# Patient Record
Sex: FEMALE | Race: WHITE | NOT HISPANIC OR LATINO | Employment: UNEMPLOYED | URBAN - METROPOLITAN AREA
[De-identification: names, ages, dates, MRNs, and addresses within clinical notes are randomized per-mention and may not be internally consistent; named-entity substitution may affect disease eponyms.]

---

## 2017-06-19 ENCOUNTER — HOSPITAL ENCOUNTER (OUTPATIENT)
Dept: OTHER | Facility: HOSPITAL | Age: 47
Discharge: HOME | End: 2017-06-19

## 2018-01-31 ENCOUNTER — OFFICE VISIT CONVERTED (OUTPATIENT)
Dept: NEUROSURGERY | Facility: CLINIC | Age: 48
End: 2018-01-31
Attending: PHYSICIAN ASSISTANT

## 2018-01-31 ENCOUNTER — CONVERSION ENCOUNTER (OUTPATIENT)
Dept: NEUROLOGY | Facility: CLINIC | Age: 48
End: 2018-01-31

## 2018-02-15 ENCOUNTER — OFFICE VISIT CONVERTED (OUTPATIENT)
Dept: NEUROSURGERY | Facility: CLINIC | Age: 48
End: 2018-02-15
Attending: PHYSICIAN ASSISTANT

## 2018-04-09 ENCOUNTER — OFFICE VISIT CONVERTED (OUTPATIENT)
Dept: NEUROSURGERY | Facility: CLINIC | Age: 48
End: 2018-04-09
Attending: PHYSICIAN ASSISTANT

## 2018-06-21 ENCOUNTER — HOSPITAL ENCOUNTER (OUTPATIENT)
Dept: OTHER | Facility: HOSPITAL | Age: 48
Discharge: HOME | End: 2018-06-21

## 2018-09-27 ENCOUNTER — OFFICE VISIT CONVERTED (OUTPATIENT)
Dept: SURGERY | Facility: CLINIC | Age: 48
End: 2018-09-27
Attending: NURSE PRACTITIONER

## 2019-05-21 ENCOUNTER — HOSPITAL ENCOUNTER (OUTPATIENT)
Dept: ULTRASOUND IMAGING | Facility: HOSPITAL | Age: 49
Discharge: HOME | End: 2019-05-21
Attending: OTOLARYNGOLOGY

## 2019-06-21 ENCOUNTER — HOSPITAL ENCOUNTER (OUTPATIENT)
Dept: OTHER | Facility: HOSPITAL | Age: 49
Discharge: HOME | End: 2019-06-21

## 2020-06-18 ENCOUNTER — OFFICE VISIT CONVERTED (OUTPATIENT)
Dept: NEUROSURGERY | Facility: CLINIC | Age: 50
End: 2020-06-18
Attending: PHYSICIAN ASSISTANT

## 2020-07-02 ENCOUNTER — OFFICE VISIT CONVERTED (OUTPATIENT)
Dept: NEUROSURGERY | Facility: CLINIC | Age: 50
End: 2020-07-02
Attending: PHYSICIAN ASSISTANT

## 2020-07-29 ENCOUNTER — CONVERSION ENCOUNTER (OUTPATIENT)
Dept: OTHER | Facility: HOSPITAL | Age: 50
End: 2020-07-29

## 2020-08-04 ENCOUNTER — OFFICE VISIT CONVERTED (OUTPATIENT)
Dept: NEUROSURGERY | Facility: CLINIC | Age: 50
End: 2020-08-04
Attending: NEUROLOGICAL SURGERY

## 2021-05-13 NOTE — PROGRESS NOTES
Progress Note      Patient Name: Ashli Gutierrez   Patient ID: [id number]   Sex: Female   YOB: 1970    Primary Care Provider: Rebecca Sky MD   Referring Provider: Radha CHEATHAM    Visit Date: August 4, 2020    Provider: Fred Muro MD   Location: [facility]   Location Address: [address] [place] [address]  [id number]   Location Phone: [phone]          Chief Complaint  · Surgical History and Physical     Here with complaints of low back and right greater than left leg pain.       History Of Present Illness     She had a discectomy a right L5-S1 in 2018. Pt now has a left L5-S1 disc protrusion. The left leg has started to bother her more, but the right leg is still worse. She is getting around a little better with an increase in her pain medication and increase tizanidine. She has had a couple of significant flareups, particularly the right leg, over the last 6 weeks.       Past Medical History  Allergic rhinitis, chronic; Arthritis; Bladder Disorder; Breast lump; CAD (coronary artery disease); Chest pain; Diabetes; GERD; Heart Attack; Heart Disease; High blood pressure; High cholesterol; Limb Pain; Limb Swelling; Lumbago/low back pain; Neurologic disorder; Sciatica         Past Surgical History  Back; Caesarean section; Cardiac; cardiac stents; Gallbladder; Minimally invasive Discectomy         Medication List  aspirin 81 mg oral tablet,delayed release (DR/EC); atorvastatin 80 mg oral tablet; cetirizine 10 mg oral tablet; gabapentin 300 mg oral capsule; isosorbide mononitrate oral; losartan 100 mg oral tablet; meloxicam 7.5 mg oral tablet; metformin 1,000 mg oral tablet; metoprolol succinate 25 mg oral tablet extended release 24 hr; montelukast 10 mg oral tablet; Nitro .4 mg; Norco 7.5-325 mg oral tablet; pantoprazole 40 mg oral tablet,delayed release (DR/EC); Plavix 75 mg oral tablet; tizanidine 4 mg oral tablet; Zofran (as hydrochloride) 4 mg oral tablet  "        Allergy List  I.V. Dye; naproxen; NSAIDS; PENICILLINS         Family Medical History  Diabetes, unspecified type; Family history of colon cancer; Family history of breast cancer         Social History  Alcohol (Current some day); Caffeine (Never); Second hand smoke exposure (Never); Tobacco (Former)         Review of Systems  · Constitutional  o Denies  o : chills, excessive sweating, fatigue, fever, sycope/passing out, weight gain, weight loss  · Eyes  o Denies  o : changes in vision, blurry vision, double vision  · HENT  o Admits  o : ringing in the ears, sinus pain, seasonal allergies  o Denies  o : loss of hearing, ear aches, sore throat, nasal congestion, nose bleeds  · Cardiovascular  o Denies  o : blood clots, swollen legs, anemia, easy burising or bleeding, transfusions  · Respiratory  o Denies  o : shortness of breath, dry cough, productive cough, pneumonia, COPD  · Gastrointestinal  o Admits  o : reflux  o Denies  o : difficulty swallowing  · Genitourinary  o Denies  o : incontinence  · Neurologic  o Admits  o : difficulty with sleep, numbness/tingling/paresthesia   o Denies  o : headache, seizure, stroke, tremor, loss of balance, falls, dizziness/vertigo, difficulty with coordination, difficulty with dexterity, weakness  · Musculoskeletal  o Admits  o : muscle aches, joint pain, sciatica, pain radiating in leg, low back pain  o Denies  o : neck stiffness/pain, swollen lymph nodes, weakness, spasms, pain radiating in arm  · Endocrine  o Admits  o : diabetes, thyroid disorder  · Psychiatric  o Denies  o : anxiety, depression      Vitals  Date Time BP Position Site L\R Cuff Size HR RR TEMP (F) WT  HT  BMI kg/m2 BSA m2 O2 Sat        08/04/2020 10:01 AM        97.3 209lbs 1oz 5'  6\" 33.74 2.1           Physical Examination  · Constitutional  o Appearance  o : well-nourished, well developed, alert, in no acute distress  · Respiratory  o Respiratory Effort  o : breathing " unlabored  · Cardiovascular  o Peripheral Vascular System  o :   § Extremities  § : no cyanosis  · Psychiatric  o Mood and Affect  o : mood normal, affect appropriate              Assessment  · Lumbago/low back pain     724.3/M54.40  · Lumbosacral disc herniation, L5-S1     722.10/M51.27  Left with right leg symptoms. Previous right L5-S1 discectomy.   · Sciatica     724.3/M54.30  · Preoperative examination     V72.84/Z01.818      Plan  · Medications  o Medications have been Reconciled  o Transition of Care or Provider Policy  · Instructions  o She could potentially benefit from a lumbar epidural steroid block. She will monitor the left leg symptoms and if worsening, could consider a discectomy.   o ****Surgical Orders****  o Outpatient  o RISK AND BENEFITS:  o Possible risks/complications, benefits and alternatives to surgical or invasive procedure have been explained to the patient and/or legal guardian.  o Patient has been evaluated and can tolerate anesthesia and/or sedation. Risks, benefits, and alternatives to anesthesia and sedation have been explained to the patient and/or legal guardian.  o PREP: Per protocol;  o IV: Per Anesthesia;  o *******************************************  o PRE- OP MEDICATION ORDER:  o *******************************************  o Clindamycin 900 mg IV on call to OR.  o ***************  o Date of Surgical Procedure:   o Please sign permit for:  o Left Approach Minimally Invasive Discectomy,  o lumbar five to sacral one  o The above History and Physical must have been completed within 30 days of admission.            Electronically Signed by: Fred Muro MD -Author on August 20, 2020 12:57:09 PM

## 2021-05-13 NOTE — PROGRESS NOTES
Progress Note      Patient Name: Ashli Gutierrez   Patient ID: [id number]   Sex: Female   YOB: 1970    Primary Care Provider: Rebecca Sky MD   Referring Provider: Radha CHEATHAM    Visit Date: July 2, 2020    Provider: Yasmeen Sharif PA-C   Location: [facility]   Location Address: [address] [place] [address]  [id number]   Location Phone: [phone]          Chief Complaint     Patient is here to discuss MRI results .       History Of Present Illness     MRI of Lspine showed L5/S1 disc extrusion on left. There is also an L3/4 left disc extrusion. She has had a previous right discectomy at L5/S1.  Pt notes that she continues to have right leg pain. She notes that she has numbness in 2 little toes. She has pain on bottom of foot. Pt notes that she has had PT previously and LESIs and they didn't really help much.       Past Medical History  Allergic rhinitis, chronic; Arthritis; Bladder Disorder; Breast lump; CAD (coronary artery disease); Chest pain; Diabetes; GERD; Heart Attack; Heart Disease; High blood pressure; High cholesterol; Limb Pain; Limb Swelling; Neurologic disorder         Past Surgical History  Back; Caesarean section; Cardiac; cardiac stents; Gallbladder; Minimally invasive Discectomy         Medication List  aspirin 81 mg oral tablet,delayed release (DR/EC); atorvastatin 80 mg oral tablet; cetirizine 10 mg oral tablet; gabapentin 300 mg oral capsule; isosorbide mononitrate oral; losartan 100 mg oral tablet; meloxicam 7.5 mg oral tablet; metformin 1,000 mg oral tablet; metoprolol succinate 25 mg oral tablet extended release 24 hr; montelukast 10 mg oral tablet; Nitro .4 mg; Norco 7.5-325 mg oral tablet; pantoprazole 40 mg oral tablet,delayed release (DR/EC); Plavix 75 mg oral tablet; Zofran (as hydrochloride) 4 mg oral tablet         Allergy List  I.V. Dye; naproxen; NSAIDS; PENICILLINS         Family Medical History  Diabetes, unspecified type;  "Family history of colon cancer; Family history of breast cancer         Social History  Alcohol (Current some day); Caffeine (Never); Second hand smoke exposure (Never); Tobacco (Former)         Review of Systems  · Constitutional  o Denies  o : fever, headache, chills  · Eyes  o Denies  o : eye pain, double vision, blurred vision  · HENT  o Denies  o : sinus problems, sore throat, ear infection  · Cardiovascular  o Denies  o : chest pain, high blood pressure, varicosities  · Respiratory  o Denies  o : shortness of breath, wheezing, frequent cough  · Gastrointestinal  o Denies  o : nausea, vomiting, heartburn, indigestion, abdominal pain  · Genitourinary  o Denies  o : urgency, frequency, urinary retention, painful urination  · Integument  o Denies  o : rash, itching, boils  · Neurologic  o Denies  o : tingling or numbness, tremors, dizzy spells  · Musculoskeletal  o Admits  o : back pain  o Denies  o : joint pain, neck pain  · Endocrine  o Denies  o : cold intolerance, heat intolerance, tired, excessive thirst, sluggish  · Psychiatric  o Admits  o : feels satisfied with life  o Denies  o : severe depression, concerns with hurting themselves  · Heme-Lymph  o Denies  o : swollen glands, blood clotting problems  · Allergic-Immunologic  o Denies  o : sinus allergy symptoms, hay fever      Vitals  Date Time BP Position Site L\R Cuff Size HR RR TEMP (F) WT  HT  BMI kg/m2 BSA m2 O2 Sat        07/02/2020 10:09 AM        97.5 210lbs 0oz 5'  6\" 33.89 2.11           Physical Examination  · Constitutional  o Appearance  o : well-nourished, well developed, alert, in no acute distress  · Respiratory  o Respiratory Effort  o : breathing unlabored  · Cardiovascular  o Peripheral Vascular System  o :   § Extremities  § : no cyanosis, clubbing or edema  · Neurologic  o Mental Status Examination  o :   § Orientation  § : grossly oriented to person, place and time  o Motor Examination  o :   § RLE Strength  § : strength " normal  § RLE Motor Function  § : tone normal, no atrophy, no abnormal movements noted  § LLE Strength  § : strength normal  § LLE Motor Function  § : tone normal, no atrophy, no abnormal movements noted  o Reflexes  o :   § RLE  § : 2/4 knee and ankle reflex  § LLE  § : 2/4 knee and ankle reflex  o Sensation  o :   § Light Touch  § : sensation intact to light touch in extremities  o Gait and Station  o :   § Gait Screening  § : gait within normal limits  · Psychiatric  o Mood and Affect  o : mood normal, affect appropriate     Straight leg raise positive on right. Tenderness of Lspine paraspinals.               Assessment  · Lumbago/low back pain     724.3/M54.40  · Sciatica     724.3/M54.30      Plan  · Medications  o Medications have been Reconciled  o Transition of Care or Provider Policy  · Instructions  o Pain management could consider increasing Norco to 7.5mg. Will followup in 1 month and consider a surgery, as there could be a small recurrent disc protrusion on right. Return sooner as needed. Get next LESI.   o Electronically Identified Patient Education Materials Provided Electronically  · Disposition  o Call or Return if symptoms worsen or persist.            Electronically Signed by: Yasmeen Sharif PA-C -Author on July 8, 2020 11:11:12 AM

## 2021-05-13 NOTE — PROGRESS NOTES
Progress Note      Patient Name: Ashli Gutierrez   Patient ID: [id number]   Sex: Female   YOB: 1970    Primary Care Provider: Rebecca Sky MD   Referring Provider: Radha CHEATHAM    Visit Date: June 18, 2020    Provider: Yasmeen Sharif PA-C   Location: [facility]   Location Address: [address] [place] [address]  [id number]   Location Phone: [phone]          Chief Complaint     Patient is here with complaints of worsening low back and left leg pain. No new images.       History Of Present Illness     She had previous discectomy with us. She notes that she has been having worsening low back pain and left leg pain. She notes that she continues to see pain management. She has been getting RFAs with pain management. She notes that she had to go back on her pain medication (Hydrocodone). She also continues gabapentin.       Past Medical History  Allergic rhinitis, chronic; Arthritis; Bladder Disorder; Breast lump; CAD (coronary artery disease); Chest pain; Diabetes; GERD; Heart Attack; Heart Disease; High blood pressure; High cholesterol; Limb Pain; Limb Swelling; Neurologic disorder         Past Surgical History  Back; Caesarean section; Cardiac; cardiac stents; Gallbladder; Minimally invasive Discectomy         Medication List  Acidophilus Probiotic 100 million cell-10 mg oral capsule; aspirin 81 mg oral tablet,delayed release (DR/EC); atorvastatin 80 mg oral tablet; cetirizine 10 mg oral tablet; gabapentin 300 mg oral capsule; isosorbide mononitrate oral; losartan 100 mg oral tablet; meloxicam 7.5 mg oral tablet; metformin 1,000 mg oral tablet; metoprolol succinate 25 mg oral tablet extended release 24 hr; montelukast 10 mg oral tablet; Nitro .4 mg; Norco 7.5-325 mg oral tablet; pantoprazole 40 mg oral tablet,delayed release (DR/EC); Plavix 75 mg oral tablet; Zantac Maximum Strength 150 mg oral tablet; Zofran (as hydrochloride) 4 mg oral tablet         Allergy  "List  I.V. Dye; naproxen; NSAIDS; PENICILLINS         Family Medical History  Diabetes, unspecified type; Family history of colon cancer; Family history of breast cancer         Social History  Alcohol (Current some day); Caffeine (Never); Second hand smoke exposure (Never); Tobacco (Former)         Review of Systems  · Constitutional  o Denies  o : fever, headache, chills  · Eyes  o Denies  o : eye pain, double vision, blurred vision  · HENT  o Denies  o : sinus problems, sore throat, ear infection  · Cardiovascular  o Denies  o : chest pain, high blood pressure, varicosities  · Respiratory  o Denies  o : shortness of breath, wheezing, frequent cough  · Gastrointestinal  o Denies  o : nausea, vomiting, heartburn, indigestion, abdominal pain  · Genitourinary  o Denies  o : urgency, frequency, urinary retention, painful urination  · Integument  o Denies  o : rash, itching, boils  · Neurologic  o Denies  o : tingling or numbness, tremors, dizzy spells  · Musculoskeletal  o Admits  o : back pain  o Denies  o : joint pain, neck pain  · Endocrine  o Denies  o : cold intolerance, heat intolerance, tired, excessive thirst, sluggish  · Psychiatric  o Admits  o : feels satisfied with life  o Denies  o : severe depression, concerns with hurting themselves  · Heme-Lymph  o Denies  o : swollen glands, blood clotting problems  · Allergic-Immunologic  o Denies  o : sinus allergy symptoms, hay fever      Vitals  Date Time BP Position Site L\R Cuff Size HR RR TEMP (F) WT  HT  BMI kg/m2 BSA m2 O2 Sat        06/18/2020 10:20 AM        97.5 208lbs 0oz 5'  6\" 33.57 2.1           Physical Examination  · Constitutional  o Appearance  o : well-nourished, well developed, alert, in no acute distress  · Respiratory  o Respiratory Effort  o : breathing unlabored  · Cardiovascular  o Peripheral Vascular System  o :   § Extremities  § : no cyanosis, clubbing or edema  · Neurologic  o Mental Status Examination  o :   § Orientation  § : grossly " oriented to person, place and time  o Motor Examination  o :   § RLE Strength  § : strength normal  § RLE Motor Function  § : tone normal, no atrophy, no abnormal movements noted  § LLE Strength  § : strength normal  § LLE Motor Function  § : tone normal, no atrophy, no abnormal movements noted  o Reflexes  o :   § RLE  § : 2/4 knee and ankle reflex  § LLE  § : 2/4 knee and ankle reflex  o Sensation  o :   § Light Touch  § : sensation intact to light touch in extremities  o Gait and Station  o :   § Gait Screening  § : gait within normal limits  · Psychiatric  o Mood and Affect  o : mood normal, affect appropriate     Straight leg raise positive on right. Tenderness of Lspine paraspinals.           Assessment  · Lumbago/low back pain     724.3/M54.40  · Sciatica     724.3/M54.30      Plan  · Orders  o MRI lumbar spine w/w/o contrst (21969) - 724.3/M54.40, 724.3/M54.30 - 06/18/2020  · Medications  o tizanidine 4 mg oral capsule   SIG: take 1 capsule (4 mg) by oral route 3 times per day for 30 days   DISP: (90) capsules with 1 refills  Prescribed on 06/18/2020     o Medications have been Reconciled  o Transition of Care or Provider Policy  · Instructions  o Encouraged to follow-up with Primary Care Provider for preventative care.  o Will refill tizanidine for patient. Will order MRI of Lspine w/wo contrast and return afterwards. Take Benadryl 50mg night prior to having MRI and 25mg 30minutes prior to MRI.   o Electronically Identified Patient Education Materials Provided Electronically  · Disposition  o Call or Return if symptoms worsen or persist.            Electronically Signed by: Yasmeen Sharif PA-C -Author on June 18, 2020 11:12:09 AM

## 2021-05-15 VITALS — BODY MASS INDEX: 33.43 KG/M2 | HEIGHT: 66 IN | TEMPERATURE: 97.5 F | WEIGHT: 208 LBS

## 2021-05-15 VITALS — WEIGHT: 209.06 LBS | BODY MASS INDEX: 33.6 KG/M2 | HEIGHT: 66 IN | TEMPERATURE: 97.3 F

## 2021-05-15 VITALS — HEIGHT: 66 IN | WEIGHT: 210 LBS | BODY MASS INDEX: 33.75 KG/M2 | TEMPERATURE: 97.5 F

## 2021-05-15 VITALS — TEMPERATURE: 97.9 F

## 2021-05-16 VITALS — HEIGHT: 66 IN | BODY MASS INDEX: 33.75 KG/M2 | HEART RATE: 86 BPM | WEIGHT: 210 LBS

## 2021-05-16 VITALS — WEIGHT: 212 LBS | BODY MASS INDEX: 34.07 KG/M2 | HEART RATE: 78 BPM | HEIGHT: 66 IN

## 2021-05-16 VITALS — BODY MASS INDEX: 33.75 KG/M2 | WEIGHT: 210 LBS | RESPIRATION RATE: 14 BRPM | HEIGHT: 66 IN

## 2021-08-23 ENCOUNTER — OFFICE VISIT (OUTPATIENT)
Dept: FAMILY MEDICINE CLINIC | Facility: CLINIC | Age: 51
End: 2021-08-23

## 2021-08-23 VITALS
WEIGHT: 210.4 LBS | BODY MASS INDEX: 33.82 KG/M2 | OXYGEN SATURATION: 97 % | TEMPERATURE: 97.5 F | HEART RATE: 77 BPM | HEIGHT: 66 IN | DIASTOLIC BLOOD PRESSURE: 84 MMHG | SYSTOLIC BLOOD PRESSURE: 120 MMHG

## 2021-08-23 DIAGNOSIS — R19.7 DIARRHEA, UNSPECIFIED TYPE: ICD-10-CM

## 2021-08-23 DIAGNOSIS — Z11.59 ENCOUNTER FOR HEPATITIS C SCREENING TEST FOR LOW RISK PATIENT: ICD-10-CM

## 2021-08-23 DIAGNOSIS — Z00.00 ENCOUNTER FOR MEDICAL EXAMINATION TO ESTABLISH CARE: Primary | ICD-10-CM

## 2021-08-23 DIAGNOSIS — E11.9 NON-INSULIN DEPENDENT TYPE 2 DIABETES MELLITUS (HCC): ICD-10-CM

## 2021-08-23 DIAGNOSIS — I25.10 CORONARY ARTERY DISEASE INVOLVING NATIVE CORONARY ARTERY OF NATIVE HEART WITHOUT ANGINA PECTORIS: ICD-10-CM

## 2021-08-23 DIAGNOSIS — B35.1 ONYCHOMYCOSIS: ICD-10-CM

## 2021-08-23 PROBLEM — N32.9 BLADDER DISORDER: Status: ACTIVE | Noted: 2021-08-23

## 2021-08-23 PROBLEM — G98.8 NEUROLOGIC DISORDER: Status: ACTIVE | Noted: 2021-08-23

## 2021-08-23 PROBLEM — M54.50 CHRONIC LOW BACK PAIN: Status: ACTIVE | Noted: 2018-02-12

## 2021-08-23 PROBLEM — Z86.39 HISTORY OF THYROID DISORDER: Status: ACTIVE | Noted: 2021-08-23

## 2021-08-23 PROBLEM — M19.90 ARTHRITIS: Status: ACTIVE | Noted: 2021-08-23

## 2021-08-23 PROBLEM — E11.40 TYPE 2 DIABETES MELLITUS WITH DIABETIC NEUROPATHY, WITHOUT LONG-TERM CURRENT USE OF INSULIN (HCC): Status: ACTIVE | Noted: 2021-08-23

## 2021-08-23 PROBLEM — G89.29 CHRONIC LOW BACK PAIN: Status: ACTIVE | Noted: 2018-02-12

## 2021-08-23 PROBLEM — M54.30 SCIATICA: Status: ACTIVE | Noted: 2020-08-04

## 2021-08-23 PROCEDURE — 82570 ASSAY OF URINE CREATININE: CPT | Performed by: FAMILY MEDICINE

## 2021-08-23 PROCEDURE — 99204 OFFICE O/P NEW MOD 45 MIN: CPT | Performed by: FAMILY MEDICINE

## 2021-08-23 PROCEDURE — 82043 UR ALBUMIN QUANTITATIVE: CPT | Performed by: FAMILY MEDICINE

## 2021-08-23 PROCEDURE — 80061 LIPID PANEL: CPT | Performed by: FAMILY MEDICINE

## 2021-08-23 PROCEDURE — 86803 HEPATITIS C AB TEST: CPT | Performed by: FAMILY MEDICINE

## 2021-08-23 PROCEDURE — 83036 HEMOGLOBIN GLYCOSYLATED A1C: CPT | Performed by: FAMILY MEDICINE

## 2021-08-23 PROCEDURE — 80053 COMPREHEN METABOLIC PANEL: CPT | Performed by: FAMILY MEDICINE

## 2021-08-23 PROCEDURE — 85025 COMPLETE CBC W/AUTO DIFF WBC: CPT | Performed by: FAMILY MEDICINE

## 2021-08-23 RX ORDER — LOSARTAN POTASSIUM 100 MG/1
100 TABLET ORAL DAILY
COMMUNITY
Start: 2021-08-19 | End: 2021-10-04 | Stop reason: SDUPTHER

## 2021-08-23 RX ORDER — CETIRIZINE HYDROCHLORIDE 10 MG/1
10 TABLET ORAL DAILY
COMMUNITY
Start: 2021-05-22 | End: 2021-10-04 | Stop reason: SDUPTHER

## 2021-08-23 RX ORDER — CICLOPIROX OLAMINE 7.7 MG/100ML
SUSPENSION TOPICAL EVERY 12 HOURS SCHEDULED
Qty: 60 ML | Refills: 0 | Status: SHIPPED | OUTPATIENT
Start: 2021-08-23 | End: 2022-03-08

## 2021-08-23 RX ORDER — NITROGLYCERIN 0.4 MG/1
0.4 TABLET SUBLINGUAL AS NEEDED
Qty: 15 TABLET | Refills: 0 | Status: SHIPPED | OUTPATIENT
Start: 2021-08-23 | End: 2021-10-04 | Stop reason: SDUPTHER

## 2021-08-23 RX ORDER — OMEPRAZOLE 20 MG/1
20 CAPSULE, DELAYED RELEASE ORAL DAILY
COMMUNITY
Start: 2021-08-17 | End: 2021-10-04 | Stop reason: SDUPTHER

## 2021-08-23 RX ORDER — ONDANSETRON 4 MG/1
1 TABLET, FILM COATED ORAL AS NEEDED
COMMUNITY
Start: 2021-08-19 | End: 2022-03-08 | Stop reason: SDUPTHER

## 2021-08-23 RX ORDER — GABAPENTIN 300 MG/1
300 CAPSULE ORAL 3 TIMES DAILY
COMMUNITY
Start: 2021-08-19 | End: 2023-03-29

## 2021-08-23 RX ORDER — NITROGLYCERIN 0.4 MG/1
0.4 TABLET SUBLINGUAL AS NEEDED
COMMUNITY
End: 2021-08-23 | Stop reason: SDUPTHER

## 2021-08-23 RX ORDER — MONTELUKAST SODIUM 10 MG/1
10 TABLET ORAL DAILY
COMMUNITY
Start: 2021-08-17 | End: 2021-10-04 | Stop reason: SDUPTHER

## 2021-08-23 RX ORDER — TIZANIDINE 4 MG/1
4 TABLET ORAL EVERY 8 HOURS PRN
COMMUNITY
Start: 2021-08-19 | End: 2021-10-04 | Stop reason: SDUPTHER

## 2021-08-23 RX ORDER — ATORVASTATIN CALCIUM 80 MG/1
80 TABLET, FILM COATED ORAL DAILY
COMMUNITY
Start: 2021-08-19 | End: 2021-10-04 | Stop reason: SDUPTHER

## 2021-08-23 RX ORDER — ISOSORBIDE MONONITRATE 60 MG/1
60 TABLET, EXTENDED RELEASE ORAL DAILY
COMMUNITY
Start: 2021-08-19 | End: 2021-10-04 | Stop reason: SDUPTHER

## 2021-08-23 RX ORDER — HYDROCODONE BITARTRATE AND ACETAMINOPHEN 5; 325 MG/1; MG/1
1 TABLET ORAL EVERY 6 HOURS PRN
COMMUNITY
Start: 2021-05-26 | End: 2021-09-23

## 2021-08-23 RX ORDER — DICYCLOMINE HYDROCHLORIDE 10 MG/1
10 CAPSULE ORAL 2 TIMES DAILY PRN
Qty: 30 CAPSULE | Refills: 0 | Status: SHIPPED | OUTPATIENT
Start: 2021-08-23 | End: 2022-03-08 | Stop reason: SDUPTHER

## 2021-08-23 RX ORDER — CLOPIDOGREL BISULFATE 75 MG/1
75 TABLET ORAL DAILY
COMMUNITY
Start: 2021-08-19 | End: 2021-10-04 | Stop reason: SDUPTHER

## 2021-08-23 NOTE — PROGRESS NOTES
"Chief Complaint    Establish Care    Subjective      Ashli Gutierrez presents to [facility]     History of Present Illness    1.) ESTABLISH CARE : Patient presents to establish care. History significant for diabetes mellitus type 2 (per patient - most recent A1C - slightly greater than 7%), CAD (stents x 3 in 2013; stent x1 in 2016 - followed by cardiology - Krzysztof Boykin). Recent move back to [facility].hsitory of chronic back pain - followed by pain management.     3.) GERD/DIARRHEA : Per patient - history of intermittent diarrhea, sometimes 4 days at a time. Previously followed by GI - who recently retired. Unclear diagnosis. Patient is requesting a referral to a new GI for an evaluation and recommendation.     4.) NIDDM : Due for a mammogram. History of mass of left breast. Followed by [facility]. Overdue for a pap test - no recent pap x 8 years.     5.) NAIL FUNGUS : Location. Bilateral feet. Has tried OTC medication with no significant relief.     Objective      Vital Signs:     /84   Pulse 77   Temp 97.5 °F (36.4 °C)   Ht 167.6 cm (66\")   Wt 95.4 kg (210 lb 6.4 oz)   SpO2 97%   BMI 33.96 kg/m²       Physical Exam  Vitals reviewed.   Constitutional:       General: She is not in acute distress.     Appearance: Normal appearance. She is well-developed.   HENT:      Head: Normocephalic and atraumatic.      Right Ear: Hearing and external ear normal.      Left Ear: Hearing and external ear normal.      Nose: Nose normal.      Mouth/Throat:      Pharynx: No oropharyngeal exudate or posterior oropharyngeal erythema.   Eyes:      General: Lids are normal.         Right eye: No discharge.         Left eye: No discharge.      Conjunctiva/sclera: Conjunctivae normal.   Cardiovascular:      Rate and Rhythm: Normal rate and regular rhythm.   Pulmonary:      Effort: Pulmonary effort is normal.      Breath sounds: Normal breath sounds.   Abdominal:    "   General: There is no distension.   Musculoskeletal:         General: No swelling.      Cervical back: Neck supple.   Skin:     Coloration: Skin is not jaundiced.      Findings: No erythema.   Neurological:      Mental Status: She is alert. Mental status is at baseline.   Psychiatric:         Mood and Affect: Mood and affect normal.         Thought Content: Thought content normal.     Assessment and Plan    Diagnoses and all orders for this visit:    1. Encounter for medical examination to establish care (Primary)  Comments:  1.) See below.     2. Diarrhea, unspecified type  Comments:  1.) Referred as noted.   Orders:  -     Ambulatory Referral to Gastroenterology    3. Coronary artery disease involving native coronary artery of native heart without angina pectoris  Comments:  1.) Continue rx as prescribed. No chest concerns today.     4. Non-insulin dependent type 2 diabetes mellitus (CMS/HCC)  -     CBC and Differential  -     Comprehensive metabolic panel  -     Hemoglobin A1c  -     Lipid panel  -     Microalbumin / Creatinine Urine Ratio - Urine, Clean Catch    5. Encounter for hepatitis C screening test for low risk patient  -     Hepatitis C antibody    6. Onychomycosis  Comments:  1.) Rx as noted.       Other orders  -     nitroglycerin (Nitrostat) 0.4 MG SL tablet; Take 1 tablet by mouth As Needed for Chest Pain.  Dispense: 15 tablet; Refill: 0  -     dicyclomine (Bentyl) 10 MG capsule; Take 1 capsule by mouth 2 (Two) Times a Day As Needed (diarrhea/abdominal sxs).  Dispense: 30 capsule; Refill: 0  -     ciclopirox (LOPROX) 0.77 % suspension; Apply  topically to the appropriate area as directed Every 12 (Twelve) Hours.  Dispense: 60 mL; Refill: 0    Follow Up     Return in 1 week (on 8/30/2021) for lab review/pap test.     Patient was given instructions and counseling regarding her condition or for health maintenance advice. Please see specific information pulled into the AVS if appropriate.

## 2021-08-23 NOTE — PROGRESS NOTES
Venipuncture Blood Specimen Collection  Venipuncture performed in left arm by Jeannette Pierce with good hemostasis. Patient tolerated the procedure well without complications.   08/23/21   Jeannette Pierce

## 2021-08-24 LAB
ALBUMIN UR-MCNC: 1.4 MG/DL
BASOPHILS # BLD AUTO: 0.06 10*3/MM3 (ref 0–0.2)
BASOPHILS NFR BLD AUTO: 0.9 % (ref 0–1.5)
CREAT UR-MCNC: 209.2 MG/DL
DEPRECATED RDW RBC AUTO: 40 FL (ref 37–54)
EOSINOPHIL # BLD AUTO: 0.1 10*3/MM3 (ref 0–0.4)
EOSINOPHIL NFR BLD AUTO: 1.4 % (ref 0.3–6.2)
ERYTHROCYTE [DISTWIDTH] IN BLOOD BY AUTOMATED COUNT: 13.4 % (ref 12.3–15.4)
HBA1C MFR BLD: 7.46 % (ref 4.8–5.6)
HCT VFR BLD AUTO: 38.6 % (ref 34–46.6)
HGB BLD-MCNC: 13 G/DL (ref 12–15.9)
IMM GRANULOCYTES # BLD AUTO: 0.03 10*3/MM3 (ref 0–0.05)
IMM GRANULOCYTES NFR BLD AUTO: 0.4 % (ref 0–0.5)
LYMPHOCYTES # BLD AUTO: 2.54 10*3/MM3 (ref 0.7–3.1)
LYMPHOCYTES NFR BLD AUTO: 36.3 % (ref 19.6–45.3)
MCH RBC QN AUTO: 28 PG (ref 26.6–33)
MCHC RBC AUTO-ENTMCNC: 33.7 G/DL (ref 31.5–35.7)
MCV RBC AUTO: 83 FL (ref 79–97)
MICROALBUMIN/CREAT UR: 6.7 MG/G
MONOCYTES # BLD AUTO: 0.41 10*3/MM3 (ref 0.1–0.9)
MONOCYTES NFR BLD AUTO: 5.9 % (ref 5–12)
NEUTROPHILS NFR BLD AUTO: 3.86 10*3/MM3 (ref 1.7–7)
NEUTROPHILS NFR BLD AUTO: 55.1 % (ref 42.7–76)
NRBC BLD AUTO-RTO: 0 /100 WBC (ref 0–0.2)
PLATELET # BLD AUTO: 249 10*3/MM3 (ref 140–450)
PMV BLD AUTO: 11.6 FL (ref 6–12)
RBC # BLD AUTO: 4.65 10*6/MM3 (ref 3.77–5.28)
WBC # BLD AUTO: 7 10*3/MM3 (ref 3.4–10.8)

## 2021-08-25 LAB
ALBUMIN SERPL-MCNC: 4.5 G/DL (ref 3.5–5.2)
ALBUMIN/GLOB SERPL: 1.7 G/DL
ALP SERPL-CCNC: 76 U/L (ref 39–117)
ALT SERPL W P-5'-P-CCNC: 22 U/L (ref 1–33)
ANION GAP SERPL CALCULATED.3IONS-SCNC: 13.1 MMOL/L (ref 5–15)
AST SERPL-CCNC: 25 U/L (ref 1–32)
BILIRUB SERPL-MCNC: 0.6 MG/DL (ref 0–1.2)
BUN SERPL-MCNC: 12 MG/DL (ref 6–20)
BUN/CREAT SERPL: 17.9 (ref 7–25)
CALCIUM SPEC-SCNC: 9.4 MG/DL (ref 8.6–10.5)
CHLORIDE SERPL-SCNC: 103 MMOL/L (ref 98–107)
CHOLEST SERPL-MCNC: 113 MG/DL (ref 0–200)
CO2 SERPL-SCNC: 25.9 MMOL/L (ref 22–29)
CREAT SERPL-MCNC: 0.67 MG/DL (ref 0.57–1)
GFR SERPL CREATININE-BSD FRML MDRD: 93 ML/MIN/1.73
GLOBULIN UR ELPH-MCNC: 2.7 GM/DL
GLUCOSE SERPL-MCNC: 135 MG/DL (ref 65–99)
HCV AB SER DONR QL: NORMAL
HDLC SERPL-MCNC: 32 MG/DL (ref 40–60)
LDLC SERPL CALC-MCNC: 66 MG/DL (ref 0–100)
LDLC/HDLC SERPL: 2.06 {RATIO}
POTASSIUM SERPL-SCNC: 4.7 MMOL/L (ref 3.5–5.2)
PROT SERPL-MCNC: 7.2 G/DL (ref 6–8.5)
SODIUM SERPL-SCNC: 142 MMOL/L (ref 136–145)
TRIGL SERPL-MCNC: 75 MG/DL (ref 0–150)
VLDLC SERPL-MCNC: 15 MG/DL (ref 5–40)

## 2021-08-27 ENCOUNTER — OFFICE VISIT (OUTPATIENT)
Dept: FAMILY MEDICINE CLINIC | Facility: CLINIC | Age: 51
End: 2021-08-27

## 2021-08-27 VITALS
OXYGEN SATURATION: 95 % | HEART RATE: 94 BPM | BODY MASS INDEX: 34.07 KG/M2 | TEMPERATURE: 96.4 F | HEIGHT: 66 IN | SYSTOLIC BLOOD PRESSURE: 124 MMHG | WEIGHT: 212 LBS | DIASTOLIC BLOOD PRESSURE: 72 MMHG

## 2021-08-27 DIAGNOSIS — E11.9 NON-INSULIN DEPENDENT TYPE 2 DIABETES MELLITUS (HCC): Primary | ICD-10-CM

## 2021-08-27 DIAGNOSIS — E78.5 HYPERLIPIDEMIA, UNSPECIFIED HYPERLIPIDEMIA TYPE: ICD-10-CM

## 2021-08-27 PROCEDURE — 99214 OFFICE O/P EST MOD 30 MIN: CPT | Performed by: FAMILY MEDICINE

## 2021-08-27 RX ORDER — ASPIRIN 81 MG/1
TABLET ORAL
COMMUNITY
End: 2021-10-04 | Stop reason: SDUPTHER

## 2021-08-27 RX ORDER — DAPAGLIFLOZIN 10 MG/1
1 TABLET, FILM COATED ORAL DAILY
Qty: 90 TABLET | Refills: 0 | Status: SHIPPED | OUTPATIENT
Start: 2021-08-27 | End: 2021-10-04 | Stop reason: SDUPTHER

## 2021-08-27 RX ORDER — METOPROLOL SUCCINATE 50 MG/1
50 TABLET, EXTENDED RELEASE ORAL
COMMUNITY
End: 2021-10-04

## 2021-08-27 RX ORDER — EMPAGLIFLOZIN 10 MG/1
TABLET, FILM COATED ORAL
COMMUNITY
Start: 2021-08-19 | End: 2021-08-27

## 2021-08-27 RX ORDER — PANTOPRAZOLE SODIUM 40 MG/1
TABLET, DELAYED RELEASE ORAL
COMMUNITY
End: 2021-10-04

## 2021-10-04 RX ORDER — CLOPIDOGREL BISULFATE 75 MG/1
75 TABLET ORAL DAILY
Qty: 90 TABLET | Refills: 1 | Status: SHIPPED | OUTPATIENT
Start: 2021-10-04 | End: 2022-03-08 | Stop reason: SDUPTHER

## 2021-10-04 RX ORDER — LOSARTAN POTASSIUM 100 MG/1
100 TABLET ORAL DAILY
Qty: 90 TABLET | Refills: 1 | Status: SHIPPED | OUTPATIENT
Start: 2021-10-04 | End: 2022-02-21 | Stop reason: SDUPTHER

## 2021-10-04 RX ORDER — OMEPRAZOLE 20 MG/1
20 CAPSULE, DELAYED RELEASE ORAL DAILY PRN
Qty: 90 CAPSULE | Refills: 1 | Status: SHIPPED | OUTPATIENT
Start: 2021-10-04 | End: 2022-03-08 | Stop reason: SDUPTHER

## 2021-10-04 RX ORDER — DAPAGLIFLOZIN 10 MG/1
1 TABLET, FILM COATED ORAL DAILY
Qty: 90 TABLET | Refills: 0 | Status: SHIPPED | OUTPATIENT
Start: 2021-10-04 | End: 2022-02-11

## 2021-10-04 RX ORDER — ISOSORBIDE MONONITRATE 60 MG/1
60 TABLET, EXTENDED RELEASE ORAL DAILY
Qty: 90 TABLET | Refills: 1 | Status: SHIPPED | OUTPATIENT
Start: 2021-10-04 | End: 2022-03-08 | Stop reason: SDUPTHER

## 2021-10-04 RX ORDER — CETIRIZINE HYDROCHLORIDE 10 MG/1
10 TABLET ORAL DAILY
Qty: 90 TABLET | Refills: 1 | Status: SHIPPED | OUTPATIENT
Start: 2021-10-04 | End: 2022-03-08 | Stop reason: SDUPTHER

## 2021-10-04 RX ORDER — ASPIRIN 81 MG/1
81 TABLET ORAL DAILY
Qty: 90 TABLET | Refills: 1 | Status: SHIPPED | OUTPATIENT
Start: 2021-10-04 | End: 2022-03-08 | Stop reason: SDUPTHER

## 2021-10-04 RX ORDER — NITROGLYCERIN 0.4 MG/1
0.4 TABLET SUBLINGUAL AS NEEDED
Qty: 15 TABLET | Refills: 0 | Status: SHIPPED | OUTPATIENT
Start: 2021-10-04 | End: 2022-03-08 | Stop reason: SDUPTHER

## 2021-10-04 RX ORDER — MONTELUKAST SODIUM 10 MG/1
10 TABLET ORAL DAILY
Qty: 90 TABLET | Refills: 1 | Status: SHIPPED | OUTPATIENT
Start: 2021-10-04 | End: 2022-03-08 | Stop reason: SDUPTHER

## 2021-10-04 RX ORDER — TIZANIDINE 4 MG/1
4 TABLET ORAL EVERY 8 HOURS PRN
Qty: 45 TABLET | Refills: 1 | Status: SHIPPED | OUTPATIENT
Start: 2021-10-04 | End: 2022-03-08 | Stop reason: SDUPTHER

## 2021-10-04 RX ORDER — ATORVASTATIN CALCIUM 80 MG/1
80 TABLET, FILM COATED ORAL DAILY
Qty: 90 TABLET | Refills: 1 | Status: SHIPPED | OUTPATIENT
Start: 2021-10-04 | End: 2022-03-08 | Stop reason: SDUPTHER

## 2021-10-25 ENCOUNTER — PREP FOR SURGERY (OUTPATIENT)
Dept: OTHER | Facility: HOSPITAL | Age: 51
End: 2021-10-25

## 2021-10-25 ENCOUNTER — OFFICE VISIT (OUTPATIENT)
Dept: GASTROENTEROLOGY | Facility: CLINIC | Age: 51
End: 2021-10-25

## 2021-10-25 VITALS
WEIGHT: 217.8 LBS | BODY MASS INDEX: 35 KG/M2 | DIASTOLIC BLOOD PRESSURE: 86 MMHG | TEMPERATURE: 98.2 F | HEART RATE: 68 BPM | HEIGHT: 66 IN | SYSTOLIC BLOOD PRESSURE: 119 MMHG

## 2021-10-25 DIAGNOSIS — R15.2 INCONTINENCE OF FECES WITH FECAL URGENCY: ICD-10-CM

## 2021-10-25 DIAGNOSIS — R15.9 INCONTINENCE OF FECES WITH FECAL URGENCY: ICD-10-CM

## 2021-10-25 DIAGNOSIS — R19.7 DIARRHEA, UNSPECIFIED TYPE: Primary | ICD-10-CM

## 2021-10-25 DIAGNOSIS — R12 HEARTBURN: ICD-10-CM

## 2021-10-25 PROCEDURE — 99204 OFFICE O/P NEW MOD 45 MIN: CPT | Performed by: NURSE PRACTITIONER

## 2021-10-25 RX ORDER — SODIUM, POTASSIUM,MAG SULFATES 17.5-3.13G
1 SOLUTION, RECONSTITUTED, ORAL ORAL EVERY 12 HOURS
Qty: 354 ML | Refills: 0 | Status: SHIPPED | OUTPATIENT
Start: 2021-10-25 | End: 2021-10-26

## 2021-10-25 RX ORDER — HYDROCODONE BITARTRATE AND ACETAMINOPHEN 5; 325 MG/1; MG/1
TABLET ORAL
COMMUNITY
Start: 2021-10-22

## 2021-10-25 RX ORDER — POLYETHYLENE GLYCOL 3350, SODIUM SULFATE ANHYDROUS, SODIUM BICARBONATE, SODIUM CHLORIDE, POTASSIUM CHLORIDE 227.1; 21.5; 6.36; 5.53; .754 G/L; G/L; G/L; G/L; G/L
4 POWDER, FOR SOLUTION ORAL DAILY
Qty: 1 EACH | Refills: 0 | Status: SHIPPED | OUTPATIENT
Start: 2021-10-25 | End: 2021-10-25 | Stop reason: SINTOL

## 2021-10-25 NOTE — PROGRESS NOTES
Patient Name: Ashli Gutierrez   Visit Date: 10/25/2021   Patient ID: [id number]  Provider: LINDEN Jacob    Sex: female  Location:  Location Address:  Location Phone: [address]  [place] [address] [phone]    YOB: 1970  Age: 51 y.o.   Primary Care Provider Jason Yousif DO      Referring Provider: Jason Yousif DO        Chief Complaint  Diarrhea (sx for several years, pt would like to know if there is any medication that she can take to help with sx)    History of Present Illness    New pt w c/o acid reflux c/o sine age 19. Has been on different meds since then, currently on Prilosec and works well.   Pt states she was dx w colitis a couple years ago based on a stool study. Pt c/o diarrhea intermittently that may last for several days. Feels like she cannot eat out d/t urge FI. States since GB removed in  she rarely has a formed stool. Has 2-3 stools /day. States she has went from loose stools to watery stools now several days/week.  PCP gave Bentyl and this is helping. +abd cramping and nausea before diarrhea begins. +nocturnal stools w FI.   Takes hydrocodone QID for back. Denies NSAIDs.   Last colonoscopy / EGD 2018 w Dr Caraballo. Reported negative    Sees Dr Whaley ([facility]) cardio-Plavix d/t 4 stents    Past Medical History:   Diagnosis Date   • Arthritis    • Bladder disorder    • CAD (coronary artery disease)    • Chronic low back pain    • Diabetes mellitus (HCC)    • GERD (gastroesophageal reflux disease)    • Hyperlipidemia    • Hypertension    • Neurologic disorder    • Sciatica    • Thyroid disorder        Past Surgical History:   Procedure Laterality Date   • CAROTID STENT     •  SECTION     • CHOLECYSTECTOMY     • COLONOSCOPY      Dr. Caraballo   • CORONARY STENT PLACEMENT     • UPPER GASTROINTESTINAL ENDOSCOPY  2019    Dr. Caraballo   • US GUIDED FINE NEEDLE ASPIRATION  2019       Allergies   Allergen Reactions   • Naproxen Shortness Of Breath  "and Hives   • Penicillins Shortness Of Breath and Hives   • Ciprofloxacin Hives     Shortness of breath   • Contrast Dye Hives     Shortness of breath   • Metronidazole Hives     Shortness of breath   • Adhesive Tape Rash     tegaderm Skin irritation        Family History   Problem Relation Age of Onset   • Colon polyps Mother    • Colon cancer Neg Hx         Social History     Tobacco Use   • Smoking status: Former Smoker   • Smokeless tobacco: Never Used   Vaping Use   • Vaping Use: Never used   Substance Use Topics   • Alcohol use: Yes     Comment: occasionally    • Drug use: Never       Objective     Vital Signs:   /86 (BP Location: Left arm, Patient Position: Sitting, Cuff Size: Adult)   Pulse 68   Temp 98.2 °F (36.8 °C) (Temporal)   Ht 167.6 cm (66\")   Wt 98.8 kg (217 lb 12.8 oz)   BMI 35.15 kg/m²       Physical Exam  Constitutional:       General: The patient is not in acute distress.     Appearance: Normal appearance.   HENT:      Head: Normocephalic and atraumatic.      Nose: Nose normal.   Pulmonary:      Effort: Pulmonary effort is normal. No respiratory distress.   Abdominal:      General: Abdomen is flat.      Palpations: Abdomen is soft. There is no mass.      Tenderness: There is no abdominal tenderness. There is no guarding.   Musculoskeletal:      Cervical back: Neck supple.      Right lower leg: No edema.      Left lower leg: No edema.   Skin:     General: Skin is warm and dry.   Neurological:      General: No focal deficit present.      Mental Status: The patient is alert and oriented to person, place, and time.      Gait: Gait normal.   Psychiatric:         Mood and Affect: Mood normal.         Speech: Speech normal.         Behavior: Behavior normal.         Thought Content: Thought content normal.     Result Review :   The following data was reviewed by: LINDEN Jacob on 10/25/2021:              Assessment and Plan    Diagnoses and all orders for this visit:    1. " Diarrhea, unspecified type (Primary)  -     Clostridium Difficile Toxin - Stool, Per Rectum; Future  -     Fecal Lactoferrin - Stool, Per Rectum; Future  -     Enteric Parasite Panel - Stool, Per Rectum; Future  -     Enteric Bacterial Panel - Stool, Per Rectum; Future    2. Heartburn    3. Incontinence of feces with fecal urgency    Other orders  -     Discontinue: PEG 3350-KCl-NaBcb-NaCl-NaSulf (Golytely) 227.1 g pack; Take 4 L by mouth Daily for 1 day. Take per office instructions  Dispense: 1 each; Refill: 0  -     sodium-potassium-magnesium sulfates (Suprep Bowel Prep Kit) 17.5-3.13-1.6 GM/177ML solution oral solution; Take 1 bottle by mouth Every 12 (Twelve) Hours for 1 day.  Dispense: 354 mL; Refill: 0            Follow Up   Check stool studies  Colonoscopy Surgical Risk and Benefits: Possible risks/complications, benefits, and alternatives to surgical or invasive procedure have been explained to patient and/or legal guardian; Patient has been evaluated and can tolerate anesthesia and/or sedation. Risks, benefits, and alternatives to anesthesia and sedation have been explained to patient and/or legal guardian. Dr Whaley cardio clearance  Requested lower volume prep after 4L sent in  Pt did not want to repeat EGD at this time    Patient was given instructions and counseling regarding her condition or for health maintenance advice. Please see specific information pulled into the AVS if appropriate.

## 2021-10-27 ENCOUNTER — PATIENT ROUNDING (BHMG ONLY) (OUTPATIENT)
Dept: GASTROENTEROLOGY | Facility: CLINIC | Age: 51
End: 2021-10-27

## 2021-10-27 NOTE — PROGRESS NOTES
October 27, 2021    Hello, may I speak with Ashli Gutierrez?    My name is Mica Prince    I am  with [facility]  [address]  [place] [address] [phone].    Before we get started may I verify your date of birth? 1970    I am calling to officially welcome you to our practice and ask about your recent visit. Is this a good time to talk? No-Voicemail not set up yet     Tell me about your visit with us. What things went well?         We're always looking for ways to make our patients' experiences even better. Do you have recommendations on ways we may improve?      Overall were you satisfied with your first visit to our practice?        I appreciate you taking the time to speak with me today. Is there anything else I can do for you?      Thank you, and have a great day.

## 2021-11-19 ENCOUNTER — TELEPHONE (OUTPATIENT)
Dept: GASTROENTEROLOGY | Facility: CLINIC | Age: 51
End: 2021-11-19

## 2021-11-19 NOTE — TELEPHONE ENCOUNTER
I rec'd note to call patient to r/s. When I s/w pt, she states she wanted to cx due to the holidays and refused to r/s. Pt aware of importance of having procedure and aware of risks of not follow through with our provider's plan but still states she will call back to r/s after the first of the year.

## 2022-01-26 ENCOUNTER — TELEPHONE (OUTPATIENT)
Dept: GASTROENTEROLOGY | Facility: CLINIC | Age: 52
End: 2022-01-26

## 2022-01-26 NOTE — TELEPHONE ENCOUNTER
Called Pt. For overdue results. Pt. Said it wasn't a good time for her to be able to go to do at the time, but she does plan on still going and will go at her earliest convince.

## 2022-02-11 RX ORDER — DAPAGLIFLOZIN 10 MG/1
TABLET, FILM COATED ORAL
Qty: 90 TABLET | Refills: 0 | Status: SHIPPED | OUTPATIENT
Start: 2022-02-11 | End: 2022-03-08 | Stop reason: SDUPTHER

## 2022-02-21 NOTE — TELEPHONE ENCOUNTER
Caller: Ashli Gutierrez    Relationship: Self    Requested Prescriptions:   Requested Prescriptions     Pending Prescriptions Disp Refills   • losartan (COZAAR) 100 MG tablet 90 tablet 1     Sig: Take 1 tablet by mouth Daily.        Pharmacy where request should be sent: [id number] [place], [place] - [address] AT [facility] [phone] [facility] [phone] FX       Does the patient have less than a 3 day supply:  [x] Yes  [] No    William Flood Rep   02/21/22 16:43 EST

## 2022-02-22 RX ORDER — LOSARTAN POTASSIUM 100 MG/1
100 TABLET ORAL DAILY
Qty: 90 TABLET | Refills: 0 | Status: SHIPPED | OUTPATIENT
Start: 2022-02-22 | End: 2022-03-08 | Stop reason: SDUPTHER

## 2022-03-08 ENCOUNTER — OFFICE VISIT (OUTPATIENT)
Dept: FAMILY MEDICINE CLINIC | Facility: CLINIC | Age: 52
End: 2022-03-08

## 2022-03-08 VITALS
WEIGHT: 211 LBS | BODY MASS INDEX: 33.91 KG/M2 | OXYGEN SATURATION: 99 % | DIASTOLIC BLOOD PRESSURE: 70 MMHG | HEIGHT: 66 IN | HEART RATE: 85 BPM | SYSTOLIC BLOOD PRESSURE: 106 MMHG | TEMPERATURE: 97.1 F

## 2022-03-08 DIAGNOSIS — E07.9 THYROID DISORDER: ICD-10-CM

## 2022-03-08 DIAGNOSIS — B35.1 ONYCHOMYCOSIS: ICD-10-CM

## 2022-03-08 DIAGNOSIS — E78.5 HYPERLIPIDEMIA, UNSPECIFIED HYPERLIPIDEMIA TYPE: ICD-10-CM

## 2022-03-08 DIAGNOSIS — I10 PRIMARY HYPERTENSION: ICD-10-CM

## 2022-03-08 DIAGNOSIS — E11.40 TYPE 2 DIABETES MELLITUS WITH DIABETIC NEUROPATHY, WITHOUT LONG-TERM CURRENT USE OF INSULIN: Primary | ICD-10-CM

## 2022-03-08 DIAGNOSIS — Z12.4 SCREENING FOR CERVICAL CANCER: ICD-10-CM

## 2022-03-08 DIAGNOSIS — G89.29 CHRONIC LOW BACK PAIN WITH SCIATICA, SCIATICA LATERALITY UNSPECIFIED, UNSPECIFIED BACK PAIN LATERALITY: ICD-10-CM

## 2022-03-08 DIAGNOSIS — Z12.31 SCREENING MAMMOGRAM FOR BREAST CANCER: ICD-10-CM

## 2022-03-08 DIAGNOSIS — M54.40 CHRONIC LOW BACK PAIN WITH SCIATICA, SCIATICA LATERALITY UNSPECIFIED, UNSPECIFIED BACK PAIN LATERALITY: ICD-10-CM

## 2022-03-08 DIAGNOSIS — Z12.11 SCREENING FOR COLON CANCER: ICD-10-CM

## 2022-03-08 PROBLEM — M51.36 DEGENERATION OF INTERVERTEBRAL DISC OF LUMBAR REGION: Status: ACTIVE | Noted: 2021-12-14

## 2022-03-08 PROBLEM — M54.16 LUMBAR RADICULOPATHY: Status: ACTIVE | Noted: 2021-12-14

## 2022-03-08 PROBLEM — M51.369 DEGENERATION OF INTERVERTEBRAL DISC OF LUMBAR REGION: Status: ACTIVE | Noted: 2021-12-14

## 2022-03-08 PROBLEM — M96.1 POSTLAMINECTOMY SYNDROME OF LUMBAR REGION: Status: ACTIVE | Noted: 2021-12-14

## 2022-03-08 LAB
ALBUMIN SERPL-MCNC: 4.6 G/DL (ref 3.5–5.2)
ALBUMIN UR-MCNC: <1.2 MG/DL
ALBUMIN/GLOB SERPL: 1.8 G/DL
ALP SERPL-CCNC: 74 U/L (ref 39–117)
ALT SERPL W P-5'-P-CCNC: 22 U/L (ref 1–33)
ANION GAP SERPL CALCULATED.3IONS-SCNC: 12.1 MMOL/L (ref 5–15)
AST SERPL-CCNC: 17 U/L (ref 1–32)
BILIRUB SERPL-MCNC: 0.6 MG/DL (ref 0–1.2)
BUN SERPL-MCNC: 12 MG/DL (ref 6–20)
BUN/CREAT SERPL: 22.2 (ref 7–25)
CALCIUM SPEC-SCNC: 9.9 MG/DL (ref 8.6–10.5)
CHLORIDE SERPL-SCNC: 98 MMOL/L (ref 98–107)
CHOLEST SERPL-MCNC: 125 MG/DL (ref 0–200)
CO2 SERPL-SCNC: 28.9 MMOL/L (ref 22–29)
CREAT SERPL-MCNC: 0.54 MG/DL (ref 0.57–1)
CREAT UR-MCNC: 41.9 MG/DL
EGFRCR SERPLBLD CKD-EPI 2021: 111.6 ML/MIN/1.73
GLOBULIN UR ELPH-MCNC: 2.6 GM/DL
GLUCOSE SERPL-MCNC: 136 MG/DL (ref 65–99)
HBA1C MFR BLD: 7.4 % (ref 4.8–5.6)
HDLC SERPL-MCNC: 35 MG/DL (ref 40–60)
LDLC SERPL CALC-MCNC: 77 MG/DL (ref 0–100)
LDLC/HDLC SERPL: 2.22 {RATIO}
MICROALBUMIN/CREAT UR: NORMAL MG/G{CREAT}
POTASSIUM SERPL-SCNC: 4.6 MMOL/L (ref 3.5–5.2)
PROT SERPL-MCNC: 7.2 G/DL (ref 6–8.5)
SODIUM SERPL-SCNC: 139 MMOL/L (ref 136–145)
TRIGL SERPL-MCNC: 61 MG/DL (ref 0–150)
TSH SERPL DL<=0.05 MIU/L-ACNC: 0.89 UIU/ML (ref 0.27–4.2)
VLDLC SERPL-MCNC: 13 MG/DL (ref 5–40)

## 2022-03-08 PROCEDURE — 82570 ASSAY OF URINE CREATININE: CPT | Performed by: FAMILY MEDICINE

## 2022-03-08 PROCEDURE — 80061 LIPID PANEL: CPT | Performed by: FAMILY MEDICINE

## 2022-03-08 PROCEDURE — 83036 HEMOGLOBIN GLYCOSYLATED A1C: CPT | Performed by: FAMILY MEDICINE

## 2022-03-08 PROCEDURE — 84443 ASSAY THYROID STIM HORMONE: CPT | Performed by: FAMILY MEDICINE

## 2022-03-08 PROCEDURE — 82043 UR ALBUMIN QUANTITATIVE: CPT | Performed by: FAMILY MEDICINE

## 2022-03-08 PROCEDURE — 80053 COMPREHEN METABOLIC PANEL: CPT | Performed by: FAMILY MEDICINE

## 2022-03-08 PROCEDURE — 99214 OFFICE O/P EST MOD 30 MIN: CPT | Performed by: FAMILY MEDICINE

## 2022-03-08 RX ORDER — BLOOD-GLUCOSE METER
1 EACH MISCELLANEOUS 2 TIMES DAILY PRN
Qty: 1 EACH | Refills: 0 | Status: SHIPPED | OUTPATIENT
Start: 2022-03-08 | End: 2023-03-29

## 2022-03-08 RX ORDER — CLOPIDOGREL BISULFATE 75 MG/1
75 TABLET ORAL DAILY
Qty: 90 TABLET | Refills: 1 | Status: SHIPPED | OUTPATIENT
Start: 2022-03-08 | End: 2022-06-29 | Stop reason: SDUPTHER

## 2022-03-08 RX ORDER — ONDANSETRON 4 MG/1
4 TABLET, FILM COATED ORAL AS NEEDED
Qty: 20 TABLET | Refills: 5 | Status: SHIPPED | OUTPATIENT
Start: 2022-03-08 | End: 2022-09-12

## 2022-03-08 RX ORDER — ISOSORBIDE MONONITRATE 60 MG/1
60 TABLET, EXTENDED RELEASE ORAL DAILY
Qty: 90 TABLET | Refills: 1 | Status: SHIPPED | OUTPATIENT
Start: 2022-03-08 | End: 2022-09-12 | Stop reason: SDUPTHER

## 2022-03-08 RX ORDER — DICYCLOMINE HYDROCHLORIDE 10 MG/1
10 CAPSULE ORAL 2 TIMES DAILY PRN
Qty: 30 CAPSULE | Refills: 5 | Status: SHIPPED | OUTPATIENT
Start: 2022-03-08 | End: 2022-09-12 | Stop reason: SDUPTHER

## 2022-03-08 RX ORDER — DAPAGLIFLOZIN 10 MG/1
1 TABLET, FILM COATED ORAL DAILY
Qty: 90 TABLET | Refills: 1 | Status: SHIPPED | OUTPATIENT
Start: 2022-03-08 | End: 2022-09-12 | Stop reason: SDUPTHER

## 2022-03-08 RX ORDER — ASPIRIN 81 MG/1
81 TABLET ORAL DAILY
Qty: 90 TABLET | Refills: 1 | Status: SHIPPED | OUTPATIENT
Start: 2022-03-08 | End: 2022-03-09

## 2022-03-08 RX ORDER — TIZANIDINE 4 MG/1
4 TABLET ORAL EVERY 8 HOURS PRN
Qty: 45 TABLET | Refills: 2 | Status: SHIPPED | OUTPATIENT
Start: 2022-03-08 | End: 2022-09-12 | Stop reason: SDUPTHER

## 2022-03-08 RX ORDER — MONTELUKAST SODIUM 10 MG/1
10 TABLET ORAL DAILY
Qty: 90 TABLET | Refills: 1 | Status: SHIPPED | OUTPATIENT
Start: 2022-03-08 | End: 2022-09-12 | Stop reason: SDUPTHER

## 2022-03-08 RX ORDER — CETIRIZINE HYDROCHLORIDE 10 MG/1
10 TABLET ORAL DAILY
Qty: 90 TABLET | Refills: 1 | Status: SHIPPED | OUTPATIENT
Start: 2022-03-08 | End: 2022-09-12 | Stop reason: SDUPTHER

## 2022-03-08 RX ORDER — LOSARTAN POTASSIUM 100 MG/1
100 TABLET ORAL DAILY
Qty: 90 TABLET | Refills: 1 | Status: SHIPPED | OUTPATIENT
Start: 2022-03-08 | End: 2022-09-12 | Stop reason: SDUPTHER

## 2022-03-08 RX ORDER — LANCETS 30 GAUGE
1 EACH MISCELLANEOUS 2 TIMES DAILY
Qty: 400 EACH | Refills: 2 | Status: SHIPPED | OUTPATIENT
Start: 2022-03-08

## 2022-03-08 RX ORDER — PERPHENAZINE 16 MG/1
TABLET, FILM COATED ORAL
Qty: 500 EACH | Refills: 2 | Status: SHIPPED | OUTPATIENT
Start: 2022-03-08 | End: 2023-03-29

## 2022-03-08 RX ORDER — ATORVASTATIN CALCIUM 80 MG/1
80 TABLET, FILM COATED ORAL DAILY
Qty: 90 TABLET | Refills: 1 | Status: SHIPPED | OUTPATIENT
Start: 2022-03-08 | End: 2022-03-09

## 2022-03-08 RX ORDER — NITROGLYCERIN 0.4 MG/1
0.4 TABLET SUBLINGUAL AS NEEDED
Qty: 15 TABLET | Refills: 3 | Status: SHIPPED | OUTPATIENT
Start: 2022-03-08 | End: 2022-09-12 | Stop reason: SDUPTHER

## 2022-03-08 RX ORDER — OMEPRAZOLE 20 MG/1
20 CAPSULE, DELAYED RELEASE ORAL DAILY PRN
Qty: 90 CAPSULE | Refills: 1 | Status: SHIPPED | OUTPATIENT
Start: 2022-03-08 | End: 2022-09-12 | Stop reason: SDUPTHER

## 2022-03-08 NOTE — PROGRESS NOTES
Venipuncture Blood Specimen Collection  Venipuncture performed in left arm  by Rubina Bingham with good hemostasis. Patient tolerated the procedure well without complications.   03/08/22   Rubina Bingham

## 2022-03-08 NOTE — PROGRESS NOTES
"Chief Complaint    Hypertension, Hyperlipidemia, and Diabetes    Subjective      Ashli Gutierrez presents to [facility]    History of Present Illness    1.) HTN : Stable. Patient denies any complaints regarding her current medications.     2.) HLD : Most recent cholesterol panel significant for low HDL.     3.) NIDDM : Most recent A1c not at goal. Patient denies any complaints with her current dose of medication. She is due for a dilated retinal + diabetic foot exam. She does note fungal infections of her some of her toes that she has not been able to get rid for with ciclopirox. History of diabetic neuropathy.    4.) CHRONIC BACK PAIN : Patient is requesting a handicap placard. She notes needing a placard due to her chronic back pain, which she admits can sometimes be debilitating.     Objective     Vital Signs:     /70   Pulse 85   Temp 97.1 °F (36.2 °C)   Ht 167.6 cm (66\")   Wt 95.7 kg (211 lb)   SpO2 99%   BMI 34.06 kg/m²       Physical Exam  Vitals reviewed.   Constitutional:       General: She is not in acute distress.     Appearance: Normal appearance. She is well-developed.   HENT:      Head: Normocephalic and atraumatic.      Right Ear: Hearing and external ear normal.      Left Ear: Hearing and external ear normal.      Nose: Nose normal.   Eyes:      General: Lids are normal.         Right eye: No discharge.         Left eye: No discharge.      Conjunctiva/sclera: Conjunctivae normal.   Pulmonary:      Effort: Pulmonary effort is normal.   Abdominal:      General: There is no distension.   Musculoskeletal:         General: No swelling.      Cervical back: Neck supple.   Skin:     Coloration: Skin is not jaundiced.      Findings: No erythema.   Neurological:      Mental Status: She is alert. Mental status is at baseline.   Psychiatric:         Mood and Affect: Mood and affect normal.         Thought Content: Thought content normal.     Assessment and Plan "     Diagnoses and all orders for this visit:    1. Type 2 diabetes mellitus with diabetic neuropathy, without long-term current use of insulin (HCC) (Primary)  Comments:  1.) Labs as noted. Additional recs per eval. Patient will scheduled dilated retinal exam.  Orders:  -     Hemoglobin A1c  -     Microalbumin / Creatinine Urine Ratio - Urine, Clean Catch    2. Primary hypertension  Comments:  1.) Stable. Remain on current dose or rx.   Orders:  -     CBC & Differential  -     Comprehensive Metabolic Panel    3. Thyroid disorder  -     TSH    4. Hyperlipidemia, unspecified hyperlipidemia type  -     Lipid Panel    5. Chronic low back pain with sciatica, sciatica laterality unspecified, unspecified back pain laterality  Comments:  1.) Form for handicap placard completed. Will upload copy to chart.    6. Screening for colon cancer  Comments:  1.) Patient is scheduled for a colonoscopy.    7. Screening mammogram for breast cancer  Comments:  1.) Completed annually at [facility] - will call to schedule.   Orders:  -     Mammo Screening Bilateral With CAD; Future    8. Screening for cervical cancer  -     Ambulatory Referral to Gynecology    9. Onychomycosis  -     Ambulatory Referral to Podiatry    Other orders  -     nitroglycerin (Nitrostat) 0.4 MG SL tablet; Take 1 tablet by mouth As Needed for Chest Pain.  Dispense: 15 tablet; Refill: 3  -     metFORMIN (GLUCOPHAGE) 1000 MG tablet; Take 1 tablet by mouth 2 (Two) Times a Day With Meals for 90 days.  Dispense: 180 tablet; Refill: 1  -     ondansetron (ZOFRAN) 4 MG tablet; Take 1 tablet by mouth As Needed for Nausea or Vomiting.  Dispense: 20 tablet; Refill: 5  -     cetirizine (zyrTEC) 10 MG tablet; Take 1 tablet by mouth Daily.  Dispense: 90 tablet; Refill: 1  -     montelukast (SINGULAIR) 10 MG tablet; Take 1 tablet by mouth Daily.  Dispense: 90 tablet; Refill: 1  -     losartan (COZAAR) 100 MG tablet; Take 1 tablet by mouth Daily.  Dispense: 90 tablet;  Refill: 1  -     omeprazole (priLOSEC) 20 MG capsule; Take 1 capsule by mouth Daily As Needed (GERD).  Dispense: 90 capsule; Refill: 1  -     metoprolol tartrate (LOPRESSOR) 25 MG tablet; Take 1 tablet by mouth 2 (Two) Times a Day.  Dispense: 180 tablet; Refill: 1  -     tiZANidine (ZANAFLEX) 4 MG tablet; Take 1 tablet by mouth Every 8 (Eight) Hours As Needed for Muscle Spasms.  Dispense: 45 tablet; Refill: 2  -     isosorbide mononitrate (IMDUR) 60 MG 24 hr tablet; Take 1 tablet by mouth Daily.  Dispense: 90 tablet; Refill: 1  -     clopidogrel (PLAVIX) 75 MG tablet; Take 1 tablet by mouth Daily.  Dispense: 90 tablet; Refill: 1  -     atorvastatin (LIPITOR) 80 MG tablet; Take 1 tablet by mouth Daily.  Dispense: 90 tablet; Refill: 1  -     aspirin (aspirin) 81 MG EC tablet; Take 1 tablet by mouth Daily.  Dispense: 90 tablet; Refill: 1  -     Dapagliflozin Propanediol (Farxiga) 10 MG tablet; Take 10 mg by mouth Daily.  Dispense: 90 tablet; Refill: 1  -     dicyclomine (Bentyl) 10 MG capsule; Take 1 capsule by mouth 2 (Two) Times a Day As Needed (diarrhea/abdominal sxs).  Dispense: 30 capsule; Refill: 5  -     Blood Glucose Monitoring Suppl (Contour Next One) device; 1 each 2 (Two) Times a Day As Needed (concern for abnormal blood glucose).  Dispense: 1 each; Refill: 0  -     glucose blood (Contour Next Test) test strip; Use as instructed  Dispense: 500 each; Refill: 2  -     Lancets misc; 1 each 2 (Two) Times a Day.  Dispense: 400 each; Refill: 2    Follow Up     Return in about 6 months (around 9/8/2022) for follow up chronic conditions.    Patient was given instructions and counseling regarding her condition or for health maintenance advice. Please see specific information pulled into the AVS if appropriate.

## 2022-03-09 RX ORDER — ASPIRIN 81 MG/1
TABLET, COATED ORAL
Qty: 90 TABLET | Refills: 1 | Status: SHIPPED | OUTPATIENT
Start: 2022-03-09 | End: 2022-09-12 | Stop reason: SDUPTHER

## 2022-03-09 RX ORDER — ATORVASTATIN CALCIUM 80 MG/1
80 TABLET, FILM COATED ORAL DAILY
Qty: 90 TABLET | Refills: 1 | Status: SHIPPED | OUTPATIENT
Start: 2022-03-09 | End: 2023-01-10

## 2022-04-19 ENCOUNTER — OFFICE VISIT (OUTPATIENT)
Dept: PODIATRY | Facility: CLINIC | Age: 52
End: 2022-04-19

## 2022-04-19 DIAGNOSIS — G62.9 NEUROPATHY: ICD-10-CM

## 2022-04-19 DIAGNOSIS — M79.671 FOOT PAIN, BILATERAL: Primary | ICD-10-CM

## 2022-04-19 DIAGNOSIS — E11.8 DIABETIC FOOT: ICD-10-CM

## 2022-04-19 DIAGNOSIS — M79.672 FOOT PAIN, BILATERAL: Primary | ICD-10-CM

## 2022-04-19 DIAGNOSIS — B35.1 ONYCHOMYCOSIS: ICD-10-CM

## 2022-04-19 DIAGNOSIS — L60.0 ONYCHOCRYPTOSIS: ICD-10-CM

## 2022-04-19 DIAGNOSIS — E11.9 NON-INSULIN DEPENDENT TYPE 2 DIABETES MELLITUS: ICD-10-CM

## 2022-04-19 PROCEDURE — 99243 OFF/OP CNSLTJ NEW/EST LOW 30: CPT | Performed by: PODIATRIST

## 2022-04-19 PROCEDURE — 11721 DEBRIDE NAIL 6 OR MORE: CPT | Performed by: PODIATRIST

## 2022-04-19 PROCEDURE — G8404 LOW EXTEMITY NEUR EXAM DOCUM: HCPCS | Performed by: PODIATRIST

## 2022-04-19 NOTE — PROGRESS NOTES
[facility] - PODIATRY    Today's Date: 22    Patient Name: Ashli Gutierrez  MRN: [id number]  [facility]: [id number]  PCP: Jason Yousif DO, Last PCP Visit: 2022  Referring Provider: Jason Yousif DO    SUBJECTIVE     Chief Complaint   Patient presents with   • Left Foot - Establish Care, Numbness     Toenail fungus     • Right Foot - Establish Care, Numbness     Toenail fungus       HPI: Ashli Gutierrez, a 52 y.o.female, presents to clinic for painful toenail and a diabetic foot evaluation.    New, Established, New Problem:  New  Location:  Toenails  Duration:   Greater than five years  Onset:  Gradual  Nature:  sore with palpation.  Stable, worsening, improving:   Worsening  Aggravating factors:  Pain with shoe gear and ambulation.  Previous Treatment: Unable to trim their own toenails.    Patient controlling diabetes via: NIDDM    Patient states there last blood glucose was: 155    Patient denies any fevers, chills, nausea, vomiting, shortness of breath, nor any other constitutional signs nor symptoms.    No other pedal complaints at this time.    Past Medical History:   Diagnosis Date   • Arthritis    • Bladder disorder    • CAD (coronary artery disease)    • Chronic low back pain    • Diabetes mellitus (HCC)    • GERD (gastroesophageal reflux disease)    • Hyperlipidemia    • Hypertension    • Neurologic disorder    • Numbness in feet    • Onychomycosis    • Sciatica    • Thyroid disorder      Past Surgical History:   Procedure Laterality Date   • CAROTID STENT     •  SECTION     • CHOLECYSTECTOMY     • COLONOSCOPY      Dr. Caraballo   • CORONARY STENT PLACEMENT     • UPPER GASTROINTESTINAL ENDOSCOPY      Dr. Caraballo   • US GUIDED FINE NEEDLE ASPIRATION  2019     Family History   Problem Relation Age of Onset   • Colon polyps Mother    • Colon cancer Neg Hx      Social History     Socioeconomic History   • Marital status:    Tobacco Use   • Smoking status:  Former Smoker     Packs/day: 1.00     Years: 12.00     Pack years: 12.00     Start date:      Quit date:      Years since quittin.3   • Smokeless tobacco: Never Used   Vaping Use   • Vaping Use: Former   Substance and Sexual Activity   • Alcohol use: Yes     Comment: occasionally    • Drug use: Never   • Sexual activity: Not Currently     Allergies   Allergen Reactions   • Naproxen Shortness Of Breath and Hives   • Penicillins Shortness Of Breath and Hives   • Adhesive Tape Rash     tegaderm Skin irritation    • Ciprofloxacin Hives     Shortness of breath   • Contrast Dye Hives     Shortness of breath   • Metronidazole Hives     Shortness of breath     Current Outpatient Medications   Medication Sig Dispense Refill   • Aspirin Low Dose 81 MG EC tablet TAKE 1 TABLET BY MOUTH DAILY 90 tablet 1   • atorvastatin (LIPITOR) 80 MG tablet TAKE 1 TABLET BY MOUTH DAILY 90 tablet 1   • Blood Glucose Monitoring Suppl (Contour Next One) device 1 each 2 (Two) Times a Day As Needed (concern for abnormal blood glucose). 1 each 0   • cetirizine (zyrTEC) 10 MG tablet Take 1 tablet by mouth Daily. 90 tablet 1   • clopidogrel (PLAVIX) 75 MG tablet Take 1 tablet by mouth Daily. 90 tablet 1   • Dapagliflozin Propanediol (Farxiga) 10 MG tablet Take 10 mg by mouth Daily. 90 tablet 1   • dicyclomine (Bentyl) 10 MG capsule Take 1 capsule by mouth 2 (Two) Times a Day As Needed (diarrhea/abdominal sxs). 30 capsule 5   • gabapentin (NEURONTIN) 300 MG capsule Take 300 mg by mouth 3 (Three) Times a Day.     • glucose blood (Contour Next Test) test strip Use as instructed 500 each 2   • HYDROcodone-acetaminophen (NORCO) 5-325 MG per tablet TAKE ONE TABLET BY MOUTH FOUR TIMES DAILY AS NEEDED FOR PAIN. FILL DATE 10/22/2021     • isosorbide mononitrate (IMDUR) 60 MG 24 hr tablet Take 1 tablet by mouth Daily. 90 tablet 1   • Lancets misc 1 each 2 (Two) Times a Day. 400 each 2   • losartan (COZAAR) 100 MG tablet Take 1 tablet by mouth  Daily. 90 tablet 1   • metFORMIN (GLUCOPHAGE) 1000 MG tablet TAKE 1 TABLET BY MOUTH TWICE DAILY WITH MEALS 180 tablet 1   • metoprolol tartrate (LOPRESSOR) 25 MG tablet Take 1 tablet by mouth 2 (Two) Times a Day. 180 tablet 1   • montelukast (SINGULAIR) 10 MG tablet Take 1 tablet by mouth Daily. 90 tablet 1   • nitroglycerin (Nitrostat) 0.4 MG SL tablet Take 1 tablet by mouth As Needed for Chest Pain. 15 tablet 3   • omeprazole (priLOSEC) 20 MG capsule Take 1 capsule by mouth Daily As Needed (GERD). 90 capsule 1   • ondansetron (ZOFRAN) 4 MG tablet Take 1 tablet by mouth As Needed for Nausea or Vomiting. 20 tablet 5   • tiZANidine (ZANAFLEX) 4 MG tablet Take 1 tablet by mouth Every 8 (Eight) Hours As Needed for Muscle Spasms. 45 tablet 2     No current facility-administered medications for this visit.     Review of Systems   Constitutional: Negative.    Skin:        Painful toenails   Neurological: Positive for numbness.   All other systems reviewed and are negative.      OBJECTIVE   There were no vitals filed for this visit.    There is no height or weight on file to calculate BMI.    Lab Results   Component Value Date    HGBA1C 7.40 (H) 03/08/2022       Lab Results   Component Value Date    GLUCOSE 136 (H) 03/08/2022    CALCIUM 9.9 03/08/2022     03/08/2022    K 4.6 03/08/2022    CO2 28.9 03/08/2022    CL 98 03/08/2022    BUN 12 03/08/2022    CREATININE 0.54 (L) 03/08/2022    EGFRIFNONA 93 08/23/2021    BCR 22.2 03/08/2022    ANIONGAP 12.1 03/08/2022       Patient seen in no apparent distress.      PHYSICAL EXAM:     Foot/Ankle Exam:       General:   Diabetic Foot Exam Performed    Appearance comment:  Chronically ill  Orientation: AAOx3    Affect: appropriate    Gait: unimpaired    Shoe Gear:  Casual shoes    VASCULAR      Right Foot Vascularity   Normal vascular exam    Dorsalis pedis:  1+  Posterior tibial:  1+  Skin Temperature: warm    Edema Grading:  None  CFT:  < 3 seconds  Pedal Hair Growth:   Absent  Varicosities: mild varicosities       Left Foot Vascularity   Normal vascular exam    Dorsalis pedis:  1+  Posterior tibial:  1+  Skin Temperature: warm    Edema Grading:  None  CFT:  < 3 seconds  Pedal Hair Growth:  Absent  Varicosities: mild varicosities        NEUROLOGIC     Right Foot Neurologic   Light touch sensation:  Diminished  Vibratory sensation:  Diminished  Hot/Cold sensation: diminished    Protective Sensation using Oakman-José Antonio Monofilament:  2     Left Foot Neurologic   Light touch sensation:  Diminished  Vibratory sensation:  Diminished  Hot/cold sensation: diminished    Protective Sensation using Oakman-José Antonio Monofilament:  2     MUSCLE STRENGTH     Right Foot Muscle Strength   Foot dorsiflexion:  4-  Foot plantar flexion:  4-  Foot inversion:  4-  Foot eversion:  4-     Left Foot Muscle Strength   Foot dorsiflexion:  4-  Foot plantar flexion:  4-  Foot inversion:  4-  Foot eversion:  4-     RANGE OF MOTION      Right Foot Range of Motion   Foot and ankle ROM within normal limits       Left Foot Range of Motion   Foot and ankle ROM within normal limits       DERMATOLOGIC     Right Foot Dermatologic   Skin: skin intact    Nails: onychomycosis, abnormally thick, subungual debris and dystrophic nails    Nails comment:  Toenails 1, 2, 3, 4, and 5     Left Foot Dermatologic   Skin: skin intact    Nails: onychomycosis, abnormally thick, subungual debris, dystrophic nails and ingrown toenail    Nails comment:  Toenails 1, 2, 3, 4, and 5            ASSESSMENT/PLAN     Diagnoses and all orders for this visit:    1. Foot pain, bilateral (Primary)    2. Onychomycosis    3. Onychocryptosis    4. Diabetic foot (HCC)    5. Non-insulin dependent type 2 diabetes mellitus (HCC)    6. Neuropathy    Rx:  Topical, compounded, antifungal medication was written; see attached prescription.    Comprehensive lower extremity examination and evaluation was performed.    Discussed findings and treatment plan  including risks, benefits, and treatment options with patient in detail. Patient agreed with treatment plan.    Medications and allergies reviewed.  Reviewed available blood glucose and HgB A1C lab values along with other pertinent labs.  These were discussed with the patient as to their importance of diabetic maintenance.    Toenails 1, 2, 3, 4, 5 on Right and 1, 2, 3, 4, 5 on Left were debrided with nail nippers then filed with a Dremel nail shakir.  Patient tolerated procedure well without complications.    Diabetic foot exam performed and documented this date, compliant with CQM required standards. Detail of findings as noted in physical exam.  Lower extremity Neurologic exam for diabetic patient performed and documented this date, compliant with PQRS required standards. Detail of findings as noted in physical exam.  Advised patient importance of good routine lower extremity hygiene. Advised patient importance of evaluating for intact skin and pain free nail borders.  Advised patient to use mirror to evaluate plantar/ soles of feet for better visualization. Advised patient monitor and phone office to be seen if any cracking to skin, open lesions, painful nail borders or if nails become elongated prior to next visit. Advised patient importance of daily cleansing of lower extremities, followed by good skin cream to maintain normal hydration of skin. Also advised patient importance of close daily monitoring of blood sugar. Advised to regulate diet and medications to maintain control of blood sugar in optimal range. Contact primary care provider if difficulties maintaining blood sugar levels.  Advised Patient of presence of Diabetes Mellitus condition.  Advised Patient risk of progression and worsening or improvement, then return of condition.  Will monitor condition for any change in future. Treat with most appropriate treatment pending status of condition.  Counseled and advised patient extensively on nature and  ramifications of diabetes. Standard instructions given to patient for good diabetic foot care and maintenance. Advised importance of careful monitoring to avoid break down and complications secondary to diabetes. Advised patient importance of strict maintenance of blood sugar control. Advised patient of possible ominous results from neglect of condition, i.e.: amputation/ loss of digits, feet and legs, or even death.  Patient states understands counseling, will monitor closely, continue good hygiene and routine diabetic foot care. Patient will contact office is questions or problems.      An After Visit Summary was printed and given to the patient at discharge, including (if requested) any available informative/educational handouts regarding diagnosis, treatment, or medications. All questions were answered to patient/family satisfaction. Should symptoms fail to improve or worsen they agree to call or return to clinic or to go to the Emergency Department. Discussed the importance of following up with any needed screening tests/labs/specialist appointments and any requested follow-up recommended by me today. Importance of maintaining follow-up discussed and patient accepts that missed appointments can delay diagnosis and potentially lead to worsening of conditions.    Return in about 9 weeks (around 6/21/2022) for Toenail Care., or sooner if acute issues arise.    This document has been electronically signed by Vance Cortez DPM on April 19, 2022 14:55 EDT

## 2022-04-28 ENCOUNTER — OFFICE VISIT (OUTPATIENT)
Dept: NEUROSURGERY | Facility: CLINIC | Age: 52
End: 2022-04-28

## 2022-04-28 VITALS
HEIGHT: 66 IN | DIASTOLIC BLOOD PRESSURE: 84 MMHG | BODY MASS INDEX: 33.62 KG/M2 | SYSTOLIC BLOOD PRESSURE: 127 MMHG | WEIGHT: 209.2 LBS

## 2022-04-28 DIAGNOSIS — M54.41 CHRONIC MIDLINE LOW BACK PAIN WITH BILATERAL SCIATICA: Primary | ICD-10-CM

## 2022-04-28 DIAGNOSIS — G89.29 CHRONIC MIDLINE LOW BACK PAIN WITH BILATERAL SCIATICA: Primary | ICD-10-CM

## 2022-04-28 DIAGNOSIS — M54.42 CHRONIC MIDLINE LOW BACK PAIN WITH BILATERAL SCIATICA: Primary | ICD-10-CM

## 2022-04-28 PROCEDURE — 99213 OFFICE O/P EST LOW 20 MIN: CPT | Performed by: NEUROLOGICAL SURGERY

## 2022-04-28 NOTE — PROGRESS NOTES
Ashli Gutierrez is a 52 y.o. female that presents with Back Pain       She has continued pain on the right. She was noticed to have decreased reflexes and some weakness on her last pain management visit. She had a more recent MRI. She has some increased numbness into the feet.      Review of Systems   Musculoskeletal: Positive for arthralgias, back pain and myalgias.   Neurological: Positive for numbness.        Vitals:    04/28/22 1041   BP: 127/84        Physical Exam  Constitutional:       Comments: BMI 33   Musculoskeletal:      Comments: Hip negative   Neurological:      Mental Status: She is alert.      Motor: Weakness (mild right quad weakness) present.      Deep Tendon Reflexes: Reflexes abnormal (1/4 at knees/absent at the ankle).   Psychiatric:         Mood and Affect: Mood normal.             Assessment and Plan {CC Problem List  Visit Diagnosis  ROS  Review (Popup)  Health Maintenance  Quality  BestPractice  Medications  SmartSets  SnapShot Encounters  Media :23}   Problem List Items Addressed This Visit        Musculoskeletal and Injuries    Chronic low back pain - Primary      She will bring her MRI for review and based on the results, we will make further recs including possible SCS trial.    Follow Up {Instructions Charge Capture  Follow-up Communications :23}   No follow-ups on file.

## 2022-04-29 ENCOUNTER — TELEPHONE (OUTPATIENT)
Dept: NEUROSURGERY | Facility: CLINIC | Age: 52
End: 2022-04-29

## 2022-04-29 NOTE — TELEPHONE ENCOUNTER
Patient did not have MRI disc at visit. She dropped off and it has been loaded to PACS for review.

## 2022-05-02 ENCOUNTER — TELEPHONE (OUTPATIENT)
Dept: FAMILY MEDICINE CLINIC | Facility: CLINIC | Age: 52
End: 2022-05-02

## 2022-05-02 DIAGNOSIS — Z12.4 SCREENING FOR CERVICAL CANCER: Primary | ICD-10-CM

## 2022-05-02 NOTE — TELEPHONE ENCOUNTER
Caller: Gutierrez Ashli S    Relationship: Self    Best call back number: [phone]    What is the medical concern/diagnosis: N/A    What specialty or service is being requested: OB/GYN    What is the provider, practice or medical service name: N/A    What is the office location: N/A    What is the office phone number: N/A    Any additional details: PATIENT CALLED IN BECAUSE SHE NEEDS A REFERRAL TO A GYNECOLOGIST. SHE CANNOT BE SEEN AT [facility] BECAUSE THEY DO NOT TAKE HER PASSPORT INSURANCE. PLEASE REFER TO ANOTHER PROVIDER THAT IS PREFERABLY IN [place] THAT TAKES HER PASSPORT INSURANCE.    SHE NEEDS THIS APPOINTMENT AS SOON AS POSSIBLE. PLEASE CALL PATIENT BACK AND ADVISE.

## 2022-05-05 ENCOUNTER — OFFICE VISIT (OUTPATIENT)
Dept: FAMILY MEDICINE CLINIC | Facility: CLINIC | Age: 52
End: 2022-05-05

## 2022-05-05 VITALS
TEMPERATURE: 96.8 F | BODY MASS INDEX: 33.78 KG/M2 | SYSTOLIC BLOOD PRESSURE: 132 MMHG | HEART RATE: 110 BPM | DIASTOLIC BLOOD PRESSURE: 74 MMHG | OXYGEN SATURATION: 95 % | WEIGHT: 210.2 LBS | HEIGHT: 66 IN

## 2022-05-05 DIAGNOSIS — Z12.4 SCREENING FOR CERVICAL CANCER: ICD-10-CM

## 2022-05-05 DIAGNOSIS — N89.8 VAGINAL ITCHING: ICD-10-CM

## 2022-05-05 DIAGNOSIS — N83.209 CYST OF OVARY, UNSPECIFIED LATERALITY: ICD-10-CM

## 2022-05-05 DIAGNOSIS — R10.9 ABDOMINAL CRAMPING: Primary | ICD-10-CM

## 2022-05-05 LAB
BILIRUB BLD-MCNC: NEGATIVE MG/DL
CANDIDA SPECIES: NEGATIVE
CLARITY, POC: CLEAR
COLOR UR: YELLOW
GARDNERELLA VAGINALIS: NEGATIVE
GLUCOSE UR STRIP-MCNC: ABNORMAL MG/DL
KETONES UR QL: ABNORMAL
LEUKOCYTE EST, POC: NEGATIVE
NITRITE UR-MCNC: NEGATIVE MG/ML
PH UR: 5 [PH] (ref 5–8)
PROT UR STRIP-MCNC: NEGATIVE MG/DL
RBC # UR STRIP: NEGATIVE /UL
SP GR UR: 1.01 (ref 1–1.03)
T VAGINALIS DNA VAG QL PROBE+SIG AMP: NEGATIVE
UROBILINOGEN UR QL: NORMAL

## 2022-05-05 PROCEDURE — 87510 GARDNER VAG DNA DIR PROBE: CPT | Performed by: NURSE PRACTITIONER

## 2022-05-05 PROCEDURE — 87480 CANDIDA DNA DIR PROBE: CPT | Performed by: NURSE PRACTITIONER

## 2022-05-05 PROCEDURE — 87624 HPV HI-RISK TYP POOLED RSLT: CPT | Performed by: NURSE PRACTITIONER

## 2022-05-05 PROCEDURE — G0123 SCREEN CERV/VAG THIN LAYER: HCPCS | Performed by: NURSE PRACTITIONER

## 2022-05-05 PROCEDURE — 87660 TRICHOMONAS VAGIN DIR PROBE: CPT | Performed by: NURSE PRACTITIONER

## 2022-05-05 PROCEDURE — 99214 OFFICE O/P EST MOD 30 MIN: CPT | Performed by: NURSE PRACTITIONER

## 2022-05-05 NOTE — PROGRESS NOTES
Chief Complaint  Abdominal Cramping (Abdominal cramping x 1 week, feels like period cramps but she has not had a period for 4 years.  Was told 5-6 yrs ago that she had a cyst.  Has a GYN referral pending.  Takes Hydrocodone for pain.)    Subjective        Past Medical History:   Diagnosis Date   • Arthritis    • Bladder disorder    • CAD (coronary artery disease)    • Chronic low back pain    • Diabetes mellitus (HCC)    • GERD (gastroesophageal reflux disease)    • Hyperlipidemia    • Hypertension    • Neurologic disorder    • Numbness in feet    • Onychomycosis    • Sciatica    • Thyroid disorder      Social History     Tobacco Use   • Smoking status: Former Smoker     Packs/day: 1.00     Years: 12.00     Pack years: 12.00     Start date:      Quit date:      Years since quittin.3   • Smokeless tobacco: Never Used   Vaping Use   • Vaping Use: Former   Substance Use Topics   • Alcohol use: Yes     Comment: occasionally    • Drug use: Never      Current Outpatient Medications on File Prior to Visit   Medication Sig   • Aspirin Low Dose 81 MG EC tablet TAKE 1 TABLET BY MOUTH DAILY   • atorvastatin (LIPITOR) 80 MG tablet TAKE 1 TABLET BY MOUTH DAILY   • Blood Glucose Monitoring Suppl (Contour Next One) device 1 each 2 (Two) Times a Day As Needed (concern for abnormal blood glucose).   • cetirizine (zyrTEC) 10 MG tablet Take 1 tablet by mouth Daily.   • clopidogrel (PLAVIX) 75 MG tablet Take 1 tablet by mouth Daily.   • Dapagliflozin Propanediol (Farxiga) 10 MG tablet Take 10 mg by mouth Daily.   • dicyclomine (Bentyl) 10 MG capsule Take 1 capsule by mouth 2 (Two) Times a Day As Needed (diarrhea/abdominal sxs).   • gabapentin (NEURONTIN) 300 MG capsule Take 300 mg by mouth 3 (Three) Times a Day.   • glucose blood (Contour Next Test) test strip Use as instructed   • HYDROcodone-acetaminophen (NORCO) 5-325 MG per tablet TAKE ONE TABLET BY MOUTH FOUR TIMES DAILY AS NEEDED FOR PAIN. FILL DATE 10/22/2021   •  isosorbide mononitrate (IMDUR) 60 MG 24 hr tablet Take 1 tablet by mouth Daily.   • Lancets misc 1 each 2 (Two) Times a Day.   • losartan (COZAAR) 100 MG tablet Take 1 tablet by mouth Daily.   • metFORMIN (GLUCOPHAGE) 1000 MG tablet TAKE 1 TABLET BY MOUTH TWICE DAILY WITH MEALS   • metoprolol tartrate (LOPRESSOR) 25 MG tablet Take 1 tablet by mouth 2 (Two) Times a Day.   • montelukast (SINGULAIR) 10 MG tablet Take 1 tablet by mouth Daily.   • nitroglycerin (Nitrostat) 0.4 MG SL tablet Take 1 tablet by mouth As Needed for Chest Pain.   • omeprazole (priLOSEC) 20 MG capsule Take 1 capsule by mouth Daily As Needed (GERD).   • ondansetron (ZOFRAN) 4 MG tablet Take 1 tablet by mouth As Needed for Nausea or Vomiting.   • tiZANidine (ZANAFLEX) 4 MG tablet Take 1 tablet by mouth Every 8 (Eight) Hours As Needed for Muscle Spasms.     No current facility-administered medications on file prior to visit.      Allergies   Allergen Reactions   • Naproxen Shortness Of Breath and Hives   • Penicillins Shortness Of Breath and Hives   • Adhesive Tape Rash     tegaderm Skin irritation    • Ciprofloxacin Hives     Shortness of breath   • Contrast Dye Hives     Shortness of breath   • Metronidazole Hives     Shortness of breath      Health Maintenance Due   Topic Date Due   • COLORECTAL CANCER SCREENING  Never done   • ANNUAL PHYSICAL  Never done   • Pneumococcal Vaccine 0-64 (1 - PCV) Never done   • Hepatitis B (1 of 3 - Risk 3-dose series) Never done   • TDAP/TD VACCINES (1 - Tdap) Never done   • MAMMOGRAM  05/06/2018   • ZOSTER VACCINE (1 of 2) Never done   • PAP SMEAR  Never done   • DIABETIC EYE EXAM  Never done      Ashli Gutierrez presents to [facility]  Here for abdominal cramping that is similar to period cramps but no period in 4 years. Cramping started about 1 week ago. Pt states she had cysts on the ovaries about 5 years ago on US. No bleeding, discharge, or odor. Pt notes itching for months  "and did a 7 day treatment and states it improved but then itching returned and repeat 1 day treatment and has improved some. Has chronic nausea due to pain medication and takes Zofran 1-2x/day. Last pap smear was about 8 years ago.     She cannot take anti-inflammatories due to Plavix.       Objective   Vital Signs:   /74 (BP Location: Left arm, Patient Position: Sitting, Cuff Size: Adult)   Pulse 110   Temp 96.8 °F (36 °C) (Temporal)   Ht 167.6 cm (66\")   Wt 95.3 kg (210 lb 3.2 oz)   SpO2 95%   BMI 33.93 kg/m²     Review of Systems   Physical Exam  Vitals reviewed.   Constitutional:       General: She is not in acute distress.     Appearance: Normal appearance. She is well-developed.   HENT:      Head: Normocephalic and atraumatic.      Right Ear: External ear normal.      Left Ear: External ear normal.   Eyes:      Conjunctiva/sclera: Conjunctivae normal.      Pupils: Pupils are equal, round, and reactive to light.   Cardiovascular:      Rate and Rhythm: Normal rate and regular rhythm.      Heart sounds: Normal heart sounds.   Pulmonary:      Effort: Pulmonary effort is normal.      Breath sounds: Normal breath sounds.   Abdominal:      Tenderness: There is abdominal tenderness in the suprapubic area.   Genitourinary:     Pubic Area: No rash.       Vagina: Normal.      Uterus: Tender.    Musculoskeletal:      Cervical back: Neck supple.      Right lower leg: No edema.      Left lower leg: No edema.   Skin:     General: Skin is warm and dry.   Neurological:      Mental Status: She is alert and oriented to person, place, and time.      Cranial Nerves: No cranial nerve deficit.   Psychiatric:         Mood and Affect: Mood and affect normal.         Behavior: Behavior normal.         Thought Content: Thought content normal.         Judgment: Judgment normal.        Result Review :   The following data was reviewed by: LINDEN Santacruz on 05/05/2022:  UA    Urinalysis 5/5/22   Ketones, UA Trace (A) "   Leukocytes, UA Negative   (A) Abnormal value                      Assessment and Plan    Diagnoses and all orders for this visit:    1. Abdominal cramping (Primary)  -     US Pelvis Transvaginal Non OB; Future  -     Gardnerella vaginalis, Trichomonas vaginalis, Candida albicans, DNA - Swab, Vagina  -     IgP, Aptima HPV    2. Vaginal itching  -     POCT urinalysis dipstick, manual  -     Gardnerella vaginalis, Trichomonas vaginalis, Candida albicans, DNA - Swab, Vagina    3. Cyst of ovary, unspecified laterality  -     US Pelvis Transvaginal Non OB; Future    4. Screening for cervical cancer  -     IgP, Aptima HPV        Follow Up   Return if symptoms worsen or fail to improve.  Patient was given instructions and counseling regarding her condition or for health maintenance advice. Please see specific information pulled into the AVS if appropriate.

## 2022-05-10 ENCOUNTER — TELEPHONE (OUTPATIENT)
Dept: FAMILY MEDICINE CLINIC | Facility: CLINIC | Age: 52
End: 2022-05-10

## 2022-05-10 NOTE — TELEPHONE ENCOUNTER
Caller: Ashli Gutierrez    Relationship: Self    Best call back number: [phone]    Caller requesting test results:YES     What test was performed: PAP     When was the test performed: 05/05/2022    Where was the test performed: IN THE OFFICE

## 2022-05-11 LAB
CYTOLOGIST CVX/VAG CYTO: NORMAL
CYTOLOGY CVX/VAG DOC CYTO: NORMAL
CYTOLOGY CVX/VAG DOC THIN PREP: NORMAL
DX ICD CODE: NORMAL
HIV 1 & 2 AB SER-IMP: NORMAL
HPV I/H RISK 4 DNA CVX QL PROBE+SIG AMP: NEGATIVE
OTHER STN SPEC: NORMAL
STAT OF ADQ CVX/VAG CYTO-IMP: NORMAL

## 2022-05-13 ENCOUNTER — APPOINTMENT (OUTPATIENT)
Dept: ULTRASOUND IMAGING | Facility: HOSPITAL | Age: 52
End: 2022-05-13

## 2022-05-20 ENCOUNTER — HOSPITAL ENCOUNTER (OUTPATIENT)
Dept: ULTRASOUND IMAGING | Facility: HOSPITAL | Age: 52
Discharge: HOME | End: 2022-05-20
Admitting: NURSE PRACTITIONER

## 2022-05-20 DIAGNOSIS — N83.209 CYST OF OVARY, UNSPECIFIED LATERALITY: ICD-10-CM

## 2022-05-20 DIAGNOSIS — R10.9 ABDOMINAL CRAMPING: ICD-10-CM

## 2022-05-20 PROCEDURE — 76830 TRANSVAGINAL US NON-OB: CPT

## 2022-05-20 PROCEDURE — 76856 US EXAM PELVIC COMPLETE: CPT

## 2022-05-23 DIAGNOSIS — N83.209 CYST OF OVARY, UNSPECIFIED LATERALITY: Primary | ICD-10-CM

## 2022-05-24 ENCOUNTER — OFFICE VISIT (OUTPATIENT)
Dept: OBSTETRICS AND GYNECOLOGY | Facility: CLINIC | Age: 52
End: 2022-05-24

## 2022-05-24 VITALS
HEIGHT: 66 IN | SYSTOLIC BLOOD PRESSURE: 128 MMHG | BODY MASS INDEX: 33.49 KG/M2 | DIASTOLIC BLOOD PRESSURE: 72 MMHG | WEIGHT: 208.4 LBS

## 2022-05-24 DIAGNOSIS — N83.201 CYST OF RIGHT OVARY: ICD-10-CM

## 2022-05-24 DIAGNOSIS — N76.3 SUBACUTE VULVITIS: Primary | ICD-10-CM

## 2022-05-24 PROCEDURE — 99204 OFFICE O/P NEW MOD 45 MIN: CPT | Performed by: OBSTETRICS & GYNECOLOGY

## 2022-05-24 RX ORDER — FLUTICASONE PROPIONATE 0.05 MG/G
1 OINTMENT TOPICAL 2 TIMES DAILY
Qty: 30 G | Refills: 3 | Status: SHIPPED | OUTPATIENT
Start: 2022-05-24 | End: 2022-09-12 | Stop reason: SDUPTHER

## 2022-05-24 NOTE — PROGRESS NOTES
REASON FOR FOLLOWUP/CHIEF COMPLAINT: Right ovarian cyst and vulvar pruritus     HISTORY OF PRESENT ILLNESS: Patient is menopausal for about the last 4 years.  She has had some recent abdominal cramping and had an ultrasound that showed a 3+ centimeter right ovarian cyst.  There were some calcification seen in the lower segment of the uterus.  Additionally, she has had some itching and burning in the outer labial regions for the last month.  She was recently appropriately ruled out for yeast vaginitis.  She had a recent Pap smear that was normal as well.  She is not used any other over-the-counter preparations and denies any known STD exposure.  Her OB/GYN history is most significant for having had a conization procedure of the cervix when she was about 24 years of age.  She states she had a couple miscarriages and later in her 30s, was placed on metformin and then became pregnant and delivered her only child at age 40.  Interestingly, she had an abdominal cerclage placed when she was about 12 weeks pregnant due to cervical shortening.  She had a  due to hypertensive complications of the pregnancy at 34 weeks but never had a tubal sterilization procedure.      Past Medical History, Past Surgical History, Social History, Family History have been reviewed and are without significant changes except as mentioned.    Review of Systems   Review of Systems   Constitutional: Negative for fatigue and fever.   Respiratory: Negative for cough and shortness of breath.    Genitourinary: Positive for pelvic pain. Negative for vaginal discharge.       Medications:  The current medication list was reviewed in the EMR    ALLERGIES:    Allergies   Allergen Reactions   • Naproxen Shortness Of Breath and Hives   • Penicillins Shortness Of Breath and Hives   • Adhesive Tape Rash     tegaderm Skin irritation    • Ciprofloxacin Hives     Shortness of breath   • Contrast Dye Hives     Shortness of breath   • Metronidazole  "Hives     Shortness of breath              Vitals:    05/24/22 1119   BP: 128/72   Weight: 94.5 kg (208 lb 6.4 oz)   Height: 167.6 cm (66\")     Physical Exam    CONSTITUTIONAL:  Vital signs reviewed.  No distress, looks comfortable.    GASTROINTESTINAL: Abdomen appears unremarkable.  Nontender.  No hepatomegaly.  No splenomegaly.    PELVIC: No external lesions.  There are subacute changes of the epithelium in the outer aspect of both labia majora mostly on the right however.  No open lesions and no vesicular lesions.  Bimanual exam is normal without any palpable masses.    PSYCHIATRIC:  Normal judgment and insight.  Normal mood and affect.       RECENT LABS:  WBC   Date Value Ref Range Status   08/23/2021 7.00 3.40 - 10.80 10*3/mm3 Final     Hemoglobin   Date Value Ref Range Status   08/23/2021 13.0 12.0 - 15.9 g/dL Final     Platelets   Date Value Ref Range Status   08/23/2021 249 140 - 450 10*3/mm3 Final       ASSESSMENT/PLAN:  Ashli Gutierrez Room/bed info not found   1.  Nonspecific vulvitis.  Will cover with Cutivate ointment twice daily for at least 10 days and then as needed after that.  2.  Right ovarian cyst.  On visualization, this might also be consistent with a hydrosalpinx.  Nonetheless, will provide follow-up ultrasound in about 6 weeks and we will do that here in the office and I will talk to her after the visit about the findings as well as the response to the Cutivate treatment.  "

## 2022-05-26 ENCOUNTER — PATIENT MESSAGE (OUTPATIENT)
Dept: OBSTETRICS AND GYNECOLOGY | Facility: CLINIC | Age: 52
End: 2022-05-26

## 2022-05-26 ENCOUNTER — TELEPHONE (OUTPATIENT)
Dept: NEUROSURGERY | Facility: CLINIC | Age: 52
End: 2022-05-26

## 2022-05-26 ENCOUNTER — PATIENT ROUNDING (BHMG ONLY) (OUTPATIENT)
Dept: OBSTETRICS AND GYNECOLOGY | Facility: CLINIC | Age: 52
End: 2022-05-26

## 2022-05-26 ENCOUNTER — TELEMEDICINE (OUTPATIENT)
Dept: NEUROSURGERY | Facility: CLINIC | Age: 52
End: 2022-05-26

## 2022-05-26 DIAGNOSIS — M54.41 CHRONIC MIDLINE LOW BACK PAIN WITH BILATERAL SCIATICA: Primary | ICD-10-CM

## 2022-05-26 DIAGNOSIS — G89.29 CHRONIC MIDLINE LOW BACK PAIN WITH BILATERAL SCIATICA: Primary | ICD-10-CM

## 2022-05-26 DIAGNOSIS — M54.42 CHRONIC MIDLINE LOW BACK PAIN WITH BILATERAL SCIATICA: Primary | ICD-10-CM

## 2022-05-26 PROCEDURE — 99213 OFFICE O/P EST LOW 20 MIN: CPT | Performed by: NEUROLOGICAL SURGERY

## 2022-05-26 NOTE — PROGRESS NOTES
Ashli Gutierrez is a 52 y.o. female that presents with lower back and bilateral leg.     Zoom video visit. Permission obtained prior to proceeding. Discussed her MRI with her. She is considering a spinal stimulator.       Review of Systems   Musculoskeletal: Positive for back pain (bilateral leg pain).        She is awake and alert. No acute distress.      I personally reviewed the patient's MRI scan which shows a left L5-S1 disc          Assessment and Plan {CC Problem List  Visit Diagnosis  ROS  Review (Popup)  Health Maintenance  Quality  BestPractice  Medications  SmartSets  SnapShot Encounters  Media :23}   Problem List Items Addressed This Visit        Musculoskeletal and Injuries    Chronic low back pain - Primary    Relevant Orders    Ambulatory Referral to Pain Management      No clear cause of right leg pain on MRI. We will refer to pain management.     Follow Up {Instructions Charge Capture  Follow-up Communications :23}   No follow-ups on file.

## 2022-05-26 NOTE — PROGRESS NOTES
My chart message has been sent to the patient for [place] [facility]

## 2022-05-26 NOTE — TELEPHONE ENCOUNTER
Pt called: she is scheduled to see Dr. Muro today at 10am and would like to see about switching it to a televisit instead of in-person. She is not feeling well. Please advise! Thanks!        Pt contact: [phone]  Best time to call: anytime

## 2022-06-29 RX ORDER — CLOPIDOGREL BISULFATE 75 MG/1
75 TABLET ORAL DAILY
Qty: 44 TABLET | OUTPATIENT
Start: 2022-06-29

## 2022-06-30 ENCOUNTER — TELEPHONE (OUTPATIENT)
Dept: FAMILY MEDICINE CLINIC | Facility: CLINIC | Age: 52
End: 2022-06-30

## 2022-06-30 RX ORDER — CLOPIDOGREL BISULFATE 75 MG/1
75 TABLET ORAL DAILY
Qty: 90 TABLET | Refills: 0 | Status: SHIPPED | OUTPATIENT
Start: 2022-06-30 | End: 2022-09-12 | Stop reason: SDUPTHER

## 2022-06-30 NOTE — TELEPHONE ENCOUNTER
Caller: Ashli Gutierrez    Relationship: Self    Best call back number: [phone]    What form or medical record are you requesting: NUMBERS FOR A1C LEVELS, TRIGLYCERIDES, AND LIPIDS    Who is requesting this form or medical record from you: CARDIOLOGIST     How would you like to receive the form or medical records (pick-up, mail, fax): FAX  If fax, what is the fax number: [phone]  ATTN: RAIMUNDO MISHRA    Timeframe paperwork needed: AS SOON AS POSSIBLE    Additional notes: PATIENT IS CALLING BACK FOR LEIA REGARDING GIVING HER THE FAX NUMBER TO PATIENT'S CARDIOLOGIST OFFICE. FAX IS LISTED ABOVE.

## 2022-07-05 ENCOUNTER — OFFICE VISIT (OUTPATIENT)
Dept: OBSTETRICS AND GYNECOLOGY | Facility: CLINIC | Age: 52
End: 2022-07-05

## 2022-07-05 VITALS
BODY MASS INDEX: 32.95 KG/M2 | HEIGHT: 66 IN | WEIGHT: 205 LBS | DIASTOLIC BLOOD PRESSURE: 72 MMHG | SYSTOLIC BLOOD PRESSURE: 134 MMHG

## 2022-07-05 DIAGNOSIS — N83.201 CYST OF RIGHT OVARY: Primary | ICD-10-CM

## 2022-07-05 DIAGNOSIS — N76.3 SUBACUTE VULVITIS: ICD-10-CM

## 2022-07-05 PROCEDURE — 99213 OFFICE O/P EST LOW 20 MIN: CPT | Performed by: OBSTETRICS & GYNECOLOGY

## 2022-07-05 RX ORDER — CLOBETASOL PROPIONATE 0.5 MG/G
1 OINTMENT TOPICAL 2 TIMES DAILY
Qty: 45 G | Refills: 3 | Status: SHIPPED | OUTPATIENT
Start: 2022-07-05 | End: 2023-03-29

## 2022-07-05 NOTE — PROGRESS NOTES
CC: Ultrasound follow-up.    Patient is here for 6-week follow-up and noted with stable appearing right ovarian oblong structure with mixed echogenicity consistent with endometrioma.  There is no free fluid in the uterus appears normal.  This looks consistent with the previous ultrasound that she had 6 weeks ago.  She has also been using the Cutivate ointment and states that she will get relief for a few hours but then it starts again to cause itching in the vulvar area.  We discussed the findings on the ultrasound and the use of the Cutivate ointment and we will move up to Temovate.  I think the area on the right that may be a small endometrioma is stable to follow and the patient is not having any ongoing significant discomfort but if that starts to present itself will move toward laparoscopic excision.  Assessment: 1.  Right endometrioma.  2.  Subacute vulvitis, incompletely managed with Cutivate ointment  Plan: Expectant management on the endometrioma patient will call if pain increases.  We will move to Temovate ointment for the vulvitis symptoms.  I spent 20 minutes caring for Ashli on this date of service. This time includes time spent by me in the following activities: preparing for the visit, obtaining and/or reviewing a separately obtained history, counseling and educating the patient/family/caregiver, ordering medications, tests, or procedures and documenting information in the medical record

## 2022-09-12 ENCOUNTER — OFFICE VISIT (OUTPATIENT)
Dept: FAMILY MEDICINE CLINIC | Facility: CLINIC | Age: 52
End: 2022-09-12

## 2022-09-12 VITALS
BODY MASS INDEX: 33.2 KG/M2 | HEIGHT: 66 IN | OXYGEN SATURATION: 95 % | TEMPERATURE: 97.8 F | HEART RATE: 105 BPM | DIASTOLIC BLOOD PRESSURE: 74 MMHG | WEIGHT: 206.6 LBS | SYSTOLIC BLOOD PRESSURE: 142 MMHG

## 2022-09-12 DIAGNOSIS — Z12.31 ENCOUNTER FOR SCREENING MAMMOGRAM FOR MALIGNANT NEOPLASM OF BREAST: ICD-10-CM

## 2022-09-12 DIAGNOSIS — Z12.11 SCREENING FOR COLON CANCER: ICD-10-CM

## 2022-09-12 DIAGNOSIS — E11.40 TYPE 2 DIABETES MELLITUS WITH DIABETIC NEUROPATHY, WITHOUT LONG-TERM CURRENT USE OF INSULIN: Primary | ICD-10-CM

## 2022-09-12 DIAGNOSIS — N76.0 ACUTE VAGINITIS: ICD-10-CM

## 2022-09-12 DIAGNOSIS — E78.49 OTHER HYPERLIPIDEMIA: ICD-10-CM

## 2022-09-12 DIAGNOSIS — I10 PRIMARY HYPERTENSION: ICD-10-CM

## 2022-09-12 PROBLEM — M54.50 CHRONIC BILATERAL LOW BACK PAIN WITHOUT SCIATICA: Status: ACTIVE | Noted: 2021-12-14

## 2022-09-12 PROBLEM — G89.29 CHRONIC BILATERAL LOW BACK PAIN WITHOUT SCIATICA: Status: ACTIVE | Noted: 2021-12-14

## 2022-09-12 LAB
ALBUMIN SERPL-MCNC: 4.5 G/DL (ref 3.5–5.2)
ALBUMIN/GLOB SERPL: 2.3 G/DL
ALP SERPL-CCNC: 99 U/L (ref 39–117)
ALT SERPL W P-5'-P-CCNC: 22 U/L (ref 1–33)
ANION GAP SERPL CALCULATED.3IONS-SCNC: 10 MMOL/L (ref 5–15)
AST SERPL-CCNC: 21 U/L (ref 1–32)
BASOPHILS # BLD AUTO: 0.07 10*3/MM3 (ref 0–0.2)
BASOPHILS NFR BLD AUTO: 1 % (ref 0–1.5)
BILIRUB SERPL-MCNC: 0.6 MG/DL (ref 0–1.2)
BUN SERPL-MCNC: 14 MG/DL (ref 6–20)
BUN/CREAT SERPL: 33.3 (ref 7–25)
CALCIUM SPEC-SCNC: 9 MG/DL (ref 8.6–10.5)
CHLORIDE SERPL-SCNC: 105 MMOL/L (ref 98–107)
CHOLEST SERPL-MCNC: 105 MG/DL (ref 0–200)
CO2 SERPL-SCNC: 27 MMOL/L (ref 22–29)
CREAT SERPL-MCNC: 0.42 MG/DL (ref 0.57–1)
DEPRECATED RDW RBC AUTO: 39.4 FL (ref 37–54)
EGFRCR SERPLBLD CKD-EPI 2021: 118.6 ML/MIN/1.73
EOSINOPHIL # BLD AUTO: 0.11 10*3/MM3 (ref 0–0.4)
EOSINOPHIL NFR BLD AUTO: 1.6 % (ref 0.3–6.2)
ERYTHROCYTE [DISTWIDTH] IN BLOOD BY AUTOMATED COUNT: 13.3 % (ref 12.3–15.4)
GLOBULIN UR ELPH-MCNC: 2 GM/DL
GLUCOSE SERPL-MCNC: 134 MG/DL (ref 65–99)
HBA1C MFR BLD: 7.5 % (ref 4.8–5.6)
HCT VFR BLD AUTO: 40.6 % (ref 34–46.6)
HDLC SERPL-MCNC: 40 MG/DL (ref 40–60)
HGB BLD-MCNC: 13.6 G/DL (ref 12–15.9)
IMM GRANULOCYTES # BLD AUTO: 0.03 10*3/MM3 (ref 0–0.05)
IMM GRANULOCYTES NFR BLD AUTO: 0.4 % (ref 0–0.5)
LDLC SERPL CALC-MCNC: 53 MG/DL (ref 0–100)
LDLC/HDLC SERPL: 1.37 {RATIO}
LYMPHOCYTES # BLD AUTO: 2.65 10*3/MM3 (ref 0.7–3.1)
LYMPHOCYTES NFR BLD AUTO: 37.9 % (ref 19.6–45.3)
MCH RBC QN AUTO: 27.5 PG (ref 26.6–33)
MCHC RBC AUTO-ENTMCNC: 33.5 G/DL (ref 31.5–35.7)
MCV RBC AUTO: 82 FL (ref 79–97)
MONOCYTES # BLD AUTO: 0.43 10*3/MM3 (ref 0.1–0.9)
MONOCYTES NFR BLD AUTO: 6.2 % (ref 5–12)
NEUTROPHILS NFR BLD AUTO: 3.7 10*3/MM3 (ref 1.7–7)
NEUTROPHILS NFR BLD AUTO: 52.9 % (ref 42.7–76)
NRBC BLD AUTO-RTO: 0 /100 WBC (ref 0–0.2)
PLATELET # BLD AUTO: 246 10*3/MM3 (ref 140–450)
PMV BLD AUTO: 10.7 FL (ref 6–12)
POTASSIUM SERPL-SCNC: 4.9 MMOL/L (ref 3.5–5.2)
PROT SERPL-MCNC: 6.5 G/DL (ref 6–8.5)
RBC # BLD AUTO: 4.95 10*6/MM3 (ref 3.77–5.28)
SODIUM SERPL-SCNC: 142 MMOL/L (ref 136–145)
TRIGL SERPL-MCNC: 51 MG/DL (ref 0–150)
VLDLC SERPL-MCNC: 12 MG/DL (ref 5–40)
WBC NRBC COR # BLD: 6.99 10*3/MM3 (ref 3.4–10.8)

## 2022-09-12 PROCEDURE — 85025 COMPLETE CBC W/AUTO DIFF WBC: CPT | Performed by: FAMILY MEDICINE

## 2022-09-12 PROCEDURE — 83036 HEMOGLOBIN GLYCOSYLATED A1C: CPT | Performed by: FAMILY MEDICINE

## 2022-09-12 PROCEDURE — 80053 COMPREHEN METABOLIC PANEL: CPT | Performed by: FAMILY MEDICINE

## 2022-09-12 PROCEDURE — 99214 OFFICE O/P EST MOD 30 MIN: CPT | Performed by: FAMILY MEDICINE

## 2022-09-12 PROCEDURE — 80061 LIPID PANEL: CPT | Performed by: FAMILY MEDICINE

## 2022-09-12 PROCEDURE — 82570 ASSAY OF URINE CREATININE: CPT | Performed by: FAMILY MEDICINE

## 2022-09-12 PROCEDURE — 82043 UR ALBUMIN QUANTITATIVE: CPT | Performed by: FAMILY MEDICINE

## 2022-09-12 RX ORDER — OMEPRAZOLE 20 MG/1
20 CAPSULE, DELAYED RELEASE ORAL DAILY PRN
Qty: 90 CAPSULE | Refills: 1 | Status: SHIPPED | OUTPATIENT
Start: 2022-09-12

## 2022-09-12 RX ORDER — NITROGLYCERIN 0.4 MG/1
0.4 TABLET SUBLINGUAL AS NEEDED
Qty: 15 TABLET | Refills: 3 | Status: SHIPPED | OUTPATIENT
Start: 2022-09-12

## 2022-09-12 RX ORDER — ONDANSETRON 4 MG/1
4 TABLET, FILM COATED ORAL EVERY 8 HOURS PRN
Qty: 15 TABLET | Refills: 2 | Status: SHIPPED | OUTPATIENT
Start: 2022-09-12

## 2022-09-12 RX ORDER — LOSARTAN POTASSIUM 100 MG/1
100 TABLET ORAL DAILY
Qty: 90 TABLET | Refills: 1 | Status: SHIPPED | OUTPATIENT
Start: 2022-09-12 | End: 2023-03-29 | Stop reason: SDUPTHER

## 2022-09-12 RX ORDER — ONDANSETRON 4 MG/1
4 TABLET, FILM COATED ORAL
COMMUNITY
Start: 2022-08-30 | End: 2022-09-12 | Stop reason: SDUPTHER

## 2022-09-12 RX ORDER — CETIRIZINE HYDROCHLORIDE 10 MG/1
10 TABLET ORAL DAILY
Qty: 90 TABLET | Refills: 1 | Status: SHIPPED | OUTPATIENT
Start: 2022-09-12 | End: 2023-01-10

## 2022-09-12 RX ORDER — DAPAGLIFLOZIN 10 MG/1
1 TABLET, FILM COATED ORAL DAILY
Qty: 90 TABLET | Refills: 1 | Status: SHIPPED | OUTPATIENT
Start: 2022-09-12 | End: 2023-03-03

## 2022-09-12 RX ORDER — FLUTICASONE PROPIONATE 0.05 MG/G
1 OINTMENT TOPICAL 2 TIMES DAILY
Qty: 30 G | Refills: 3 | Status: SHIPPED | OUTPATIENT
Start: 2022-09-12 | End: 2023-03-29 | Stop reason: SDUPTHER

## 2022-09-12 RX ORDER — MONTELUKAST SODIUM 10 MG/1
10 TABLET ORAL DAILY
Qty: 90 TABLET | Refills: 1 | Status: SHIPPED | OUTPATIENT
Start: 2022-09-12 | End: 2023-01-10

## 2022-09-12 RX ORDER — CLOPIDOGREL BISULFATE 75 MG/1
75 TABLET ORAL DAILY
Qty: 90 TABLET | Refills: 1 | Status: SHIPPED | OUTPATIENT
Start: 2022-09-12 | End: 2023-03-29 | Stop reason: SDUPTHER

## 2022-09-12 RX ORDER — DICYCLOMINE HYDROCHLORIDE 10 MG/1
10 CAPSULE ORAL 2 TIMES DAILY PRN
Qty: 15 CAPSULE | Refills: 2 | Status: SHIPPED | OUTPATIENT
Start: 2022-09-12

## 2022-09-12 RX ORDER — TIZANIDINE 4 MG/1
4 TABLET ORAL EVERY 8 HOURS PRN
Qty: 45 TABLET | Refills: 2 | Status: SHIPPED | OUTPATIENT
Start: 2022-09-12 | End: 2022-10-31

## 2022-09-12 RX ORDER — ISOSORBIDE MONONITRATE 60 MG/1
60 TABLET, EXTENDED RELEASE ORAL DAILY
Qty: 90 TABLET | Refills: 1 | Status: SHIPPED | OUTPATIENT
Start: 2022-09-12 | End: 2023-03-20 | Stop reason: SDUPTHER

## 2022-09-12 RX ORDER — GABAPENTIN 300 MG/1
300 CAPSULE ORAL 4 TIMES DAILY
COMMUNITY
Start: 2022-07-18 | End: 2022-09-12

## 2022-09-12 RX ORDER — FLUCONAZOLE 150 MG/1
150 TABLET ORAL DAILY
Qty: 3 TABLET | Refills: 0 | Status: SHIPPED | OUTPATIENT
Start: 2022-09-12 | End: 2022-09-15

## 2022-09-12 RX ORDER — ASPIRIN 81 MG/1
81 TABLET ORAL DAILY
Qty: 90 TABLET | Refills: 1 | Status: SHIPPED | OUTPATIENT
Start: 2022-09-12 | End: 2023-03-29 | Stop reason: SDUPTHER

## 2022-09-12 NOTE — PROGRESS NOTES
"Chief Complaint    Diabetes (Follow-up with labs) and Hypertension (Follow-up with labs)    Subjective      Ashli Gutierrez presents to [facility]    History of Present Illness    1.) NIDDM : Most recent A1C measured at 7.40%. Patient presents with no significant complaints regarding her current dosages of medications.     2.) HTN : Stable. Patient did not take her BP medication this AM.     3.) VAGINITIS : Onset of sxs - unclear. Recently treated per gyn per patient. (+) Topical medications with no significant relief. Pt reports excessive pruritus. No recent swab.     Objective     Vital Signs:     /74 (BP Location: Left arm, Patient Position: Sitting, Cuff Size: Adult)   Pulse 105   Temp 97.8 °F (36.6 °C) (Temporal)   Ht 167.6 cm (66\")   Wt 93.7 kg (206 lb 9.6 oz)   SpO2 95%   BMI 33.35 kg/m²       Physical Exam  Vitals reviewed.   Constitutional:       General: She is not in acute distress.     Appearance: Normal appearance. She is well-developed.   HENT:      Head: Normocephalic and atraumatic.      Right Ear: Hearing and external ear normal.      Left Ear: Hearing and external ear normal.      Nose: Nose normal.   Eyes:      General: Lids are normal.         Right eye: No discharge.         Left eye: No discharge.      Conjunctiva/sclera: Conjunctivae normal.   Cardiovascular:      Rate and Rhythm: Normal rate and regular rhythm.   Pulmonary:      Effort: Pulmonary effort is normal.   Abdominal:      General: There is no distension.   Musculoskeletal:         General: No swelling.      Cervical back: Neck supple.   Skin:     Coloration: Skin is not jaundiced.      Findings: No erythema.   Neurological:      Mental Status: She is alert. Mental status is at baseline.   Psychiatric:         Mood and Affect: Mood and affect normal.         Thought Content: Thought content normal.     Assessment and Plan     Diagnoses and all orders for this visit:    1. Type 2 diabetes " mellitus with diabetic neuropathy, without long-term current use of insulin (HCC) (Primary)  Comments:  Refills as noted - labs as noted - continue medications at current dosages - additional recs per review of labs.  Orders:  -     Hemoglobin A1c  -     Cancel: CBC & Differential  -     Comprehensive Metabolic Panel  -     Microalbumin / Creatinine Urine Ratio - Urine, Clean Catch  -     Lipid Panel    2. Primary hypertension  Comments:  See above.    3. Acute vaginitis  Comments:  Trial of PO fluconazole as noted.    4. Encounter for screening mammogram for malignant neoplasm of breast  Comments:  Scheduled for October 2022.      5. Screening for colon cancer  Comments:  Scheduled for 10/2022.    6. Other hyperlipidemia  Comments:  See above.    Other orders  -     aspirin (Aspirin Low Dose) 81 MG EC tablet; Take 1 tablet by mouth Daily.  Dispense: 90 tablet; Refill: 1  -     metFORMIN (GLUCOPHAGE) 1000 MG tablet; Take 1 tablet by mouth 2 (Two) Times a Day With Meals.  Dispense: 180 tablet; Refill: 1  -     nitroglycerin (Nitrostat) 0.4 MG SL tablet; Take 1 tablet by mouth As Needed for Chest Pain.  Dispense: 15 tablet; Refill: 3  -     fluticasone (CUTIVATE) 0.005 % ointment; Apply 1 application topically to the appropriate area as directed 2 (Two) Times a Day.  Dispense: 30 g; Refill: 3  -     dapagliflozin Propanediol (Farxiga) 10 MG tablet; Take 10 mg by mouth Daily.  Dispense: 90 tablet; Refill: 1  -     cetirizine (zyrTEC) 10 MG tablet; Take 1 tablet by mouth Daily.  Dispense: 90 tablet; Refill: 1  -     dicyclomine (Bentyl) 10 MG capsule; Take 1 capsule by mouth 2 (Two) Times a Day As Needed (diarrhea/abdominal sxs).  Dispense: 15 capsule; Refill: 2  -     montelukast (SINGULAIR) 10 MG tablet; Take 1 tablet by mouth Daily.  Dispense: 90 tablet; Refill: 1  -     losartan (COZAAR) 100 MG tablet; Take 1 tablet by mouth Daily.  Dispense: 90 tablet; Refill: 1  -     omeprazole (priLOSEC) 20 MG capsule; Take 1  capsule by mouth Daily As Needed (GERD).  Dispense: 90 capsule; Refill: 1  -     metoprolol tartrate (LOPRESSOR) 25 MG tablet; Take 1 tablet by mouth 2 (Two) Times a Day.  Dispense: 180 tablet; Refill: 1  -     ondansetron (ZOFRAN) 4 MG tablet; Take 1 tablet by mouth Every 8 (Eight) Hours As Needed for Nausea or Vomiting.  Dispense: 15 tablet; Refill: 2  -     tiZANidine (ZANAFLEX) 4 MG tablet; Take 1 tablet by mouth Every 8 (Eight) Hours As Needed for Muscle Spasms.  Dispense: 45 tablet; Refill: 2  -     isosorbide mononitrate (IMDUR) 60 MG 24 hr tablet; Take 1 tablet by mouth Daily.  Dispense: 90 tablet; Refill: 1  -     clopidogrel (PLAVIX) 75 MG tablet; Take 1 tablet by mouth Daily.  Dispense: 90 tablet; Refill: 1  -     fluconazole (Diflucan) 150 MG tablet; Take 1 tablet by mouth Daily for 3 doses.  Dispense: 3 tablet; Refill: 0    Follow Up     Return in about 6 months (around 3/12/2023).    Patient was given instructions and counseling regarding her condition or for health maintenance advice. Please see specific information pulled into the AVS if appropriate.

## 2022-09-12 NOTE — PROGRESS NOTES
Venipuncture Blood Specimen Collection  Venipuncture performed in left arm by Jeannette Pierce with good hemostasis. Patient tolerated the procedure well without complications.   09/12/22   Jeannette Pierce

## 2022-09-13 LAB
ALBUMIN UR-MCNC: <1.2 MG/DL
CREAT UR-MCNC: 131.3 MG/DL
MICROALBUMIN/CREAT UR: NORMAL MG/G{CREAT}

## 2022-09-14 ENCOUNTER — TELEPHONE (OUTPATIENT)
Dept: FAMILY MEDICINE CLINIC | Facility: CLINIC | Age: 52
End: 2022-09-14

## 2022-09-14 NOTE — TELEPHONE ENCOUNTER
Caller: Ashli Gutierrez    Relationship: Self    Best call back number: [phone]    What is the best time to reach you: ANYTIME     Who are you requesting to speak with (clinical staff, provider,  specific staff member):  CLINICAL          What was the call regarding:      THE PATIENT SAID HER MEDICATIONS ONLY HAVE ONE REFILL. SHE IS REQUESTING PCP  TO PUT MORE REFILLS ON HER MEDICATIONS .       SHE SAID TO  CALL HER IF NEEDED      Do you require a callback: YES

## 2022-10-14 ENCOUNTER — TELEPHONE (OUTPATIENT)
Dept: FAMILY MEDICINE CLINIC | Facility: CLINIC | Age: 52
End: 2022-10-14

## 2022-10-14 NOTE — TELEPHONE ENCOUNTER
Sun Machado :   Please let the patient know that I cannot prescribed antibiotics without documentation and a physical exam. She might consider being evaluated in an urgent care. Thank you!

## 2022-10-14 NOTE — TELEPHONE ENCOUNTER
Caller: Brenda Ashli S    Relationship: Self    Best call back number: [phone]    What medication are you requesting: ANTIBIOTIC    What are your current symptoms: FACE SWELLED, PAIN IN TOOTH, SLIGHT FEVER    How long have you been experiencing symptoms: 4-5 DAYS AGO    Have you had these symptoms before:    [] Yes  [x] No    Have you been treated for these symptoms before:   [] Yes  [x] No    If a prescription is needed, what is your preferred pharmacy and phone number: [id number] - [address] & [facility] [phone] [facility] [phone]      Additional notes:   PATIENT HAS BEEN HAVING PROBLEMS WITH HER TOOTH AND BELIEVES IT MAY BE INFECTION. HER DENTIST IS CLOSED 10/14/2022. SHE WOULD LIKE ANTIBIOTIC TO HELP FOR THE TIME BEING.

## 2022-10-31 RX ORDER — TIZANIDINE 4 MG/1
TABLET ORAL
Qty: 45 TABLET | Refills: 2 | Status: SHIPPED | OUTPATIENT
Start: 2022-10-31

## 2023-01-10 RX ORDER — CETIRIZINE HYDROCHLORIDE 10 MG/1
TABLET ORAL
Qty: 90 TABLET | Refills: 0 | Status: SHIPPED | OUTPATIENT
Start: 2023-01-10

## 2023-01-10 RX ORDER — ATORVASTATIN CALCIUM 80 MG/1
80 TABLET, FILM COATED ORAL DAILY
Qty: 90 TABLET | Refills: 0 | Status: SHIPPED | OUTPATIENT
Start: 2023-01-10 | End: 2023-03-29 | Stop reason: SDUPTHER

## 2023-01-10 RX ORDER — MONTELUKAST SODIUM 10 MG/1
10 TABLET ORAL DAILY
Qty: 90 TABLET | Refills: 0 | Status: SHIPPED | OUTPATIENT
Start: 2023-01-10 | End: 2023-03-29 | Stop reason: SDUPTHER

## 2023-03-03 RX ORDER — DAPAGLIFLOZIN 10 MG/1
TABLET, FILM COATED ORAL
Qty: 90 TABLET | Refills: 1 | Status: SHIPPED | OUTPATIENT
Start: 2023-03-03 | End: 2023-03-29 | Stop reason: SDUPTHER

## 2023-03-20 ENCOUNTER — TELEPHONE (OUTPATIENT)
Dept: FAMILY MEDICINE CLINIC | Facility: CLINIC | Age: 53
End: 2023-03-20
Payer: COMMERCIAL

## 2023-03-20 RX ORDER — ISOSORBIDE MONONITRATE 60 MG/1
60 TABLET, EXTENDED RELEASE ORAL DAILY
Qty: 14 TABLET | Refills: 0 | Status: SHIPPED | OUTPATIENT
Start: 2023-03-20 | End: 2023-03-29 | Stop reason: SDUPTHER

## 2023-03-20 NOTE — TELEPHONE ENCOUNTER
Caller: Ashli Gutierrez    Relationship: Self    Best call back number: [phone]    Requested Prescriptions:   Requested Prescriptions     Pending Prescriptions Disp Refills   • metoprolol tartrate (LOPRESSOR) 25 MG tablet 180 tablet 1     Sig: Take 1 tablet by mouth 2 (Two) Times a Day.   • isosorbide mononitrate (IMDUR) 60 MG 24 hr tablet 90 tablet 1     Sig: Take 1 tablet by mouth Daily.        Pharmacy where request should be sent: [id number] [address] [facility] AT [facility] [phone] [facility] [phone] FX     Additional details provided by patient: PATIENT HAS AN APPOINTMENT SCHEDULED FOR 4.28.23 BUT WILL RUN OUT OF THESE MEDICATIONS BEFORE THEN. PATIENT IS ASKING FOR A REFILL TO GET HER THROUGH TO THE APPOINTMENT.     Does the patient have less than a 3 day supply:  [x] Yes  [] No    Would you like a call back once the refill request has been completed: [] Yes [] No    If the office needs to give you a call back, can they leave a voicemail: [] Yes [] No    Taylor Barron, PCT   03/20/23 09:02 EDT

## 2023-03-29 ENCOUNTER — OFFICE VISIT (OUTPATIENT)
Dept: FAMILY MEDICINE CLINIC | Facility: CLINIC | Age: 53
End: 2023-03-29
Payer: COMMERCIAL

## 2023-03-29 VITALS
HEART RATE: 93 BPM | WEIGHT: 208.6 LBS | OXYGEN SATURATION: 95 % | SYSTOLIC BLOOD PRESSURE: 130 MMHG | BODY MASS INDEX: 33.52 KG/M2 | DIASTOLIC BLOOD PRESSURE: 74 MMHG | TEMPERATURE: 97.3 F | HEIGHT: 66 IN

## 2023-03-29 DIAGNOSIS — Z12.31 ENCOUNTER FOR SCREENING MAMMOGRAM FOR MALIGNANT NEOPLASM OF BREAST: ICD-10-CM

## 2023-03-29 DIAGNOSIS — Z12.11 SCREENING FOR COLON CANCER: ICD-10-CM

## 2023-03-29 DIAGNOSIS — I10 PRIMARY HYPERTENSION: ICD-10-CM

## 2023-03-29 DIAGNOSIS — E11.40 TYPE 2 DIABETES MELLITUS WITH DIABETIC NEUROPATHY, WITHOUT LONG-TERM CURRENT USE OF INSULIN: Primary | ICD-10-CM

## 2023-03-29 DIAGNOSIS — Z23 NEED FOR PNEUMOCOCCAL VACCINE: ICD-10-CM

## 2023-03-29 DIAGNOSIS — E78.5 HYPERLIPIDEMIA, UNSPECIFIED HYPERLIPIDEMIA TYPE: ICD-10-CM

## 2023-03-29 DIAGNOSIS — E07.9 THYROID DISORDER: ICD-10-CM

## 2023-03-29 LAB
ALBUMIN SERPL-MCNC: 4.5 G/DL (ref 3.5–5.2)
ALBUMIN UR-MCNC: <1.2 MG/DL
ALBUMIN/GLOB SERPL: 1.7 G/DL
ALP SERPL-CCNC: 89 U/L (ref 39–117)
ALT SERPL W P-5'-P-CCNC: 17 U/L (ref 1–33)
ANION GAP SERPL CALCULATED.3IONS-SCNC: 11 MMOL/L (ref 5–15)
AST SERPL-CCNC: 18 U/L (ref 1–32)
BASOPHILS # BLD AUTO: 0.06 10*3/MM3 (ref 0–0.2)
BASOPHILS NFR BLD AUTO: 0.9 % (ref 0–1.5)
BILIRUB SERPL-MCNC: 0.5 MG/DL (ref 0–1.2)
BUN SERPL-MCNC: 14 MG/DL (ref 6–20)
BUN/CREAT SERPL: 25.5 (ref 7–25)
CALCIUM SPEC-SCNC: 9 MG/DL (ref 8.6–10.5)
CHLORIDE SERPL-SCNC: 101 MMOL/L (ref 98–107)
CHOLEST SERPL-MCNC: 111 MG/DL (ref 0–200)
CO2 SERPL-SCNC: 28 MMOL/L (ref 22–29)
CREAT SERPL-MCNC: 0.55 MG/DL (ref 0.57–1)
CREAT UR-MCNC: 85.5 MG/DL
DEPRECATED RDW RBC AUTO: 40.6 FL (ref 37–54)
EGFRCR SERPLBLD CKD-EPI 2021: 110.4 ML/MIN/1.73
EOSINOPHIL # BLD AUTO: 0.14 10*3/MM3 (ref 0–0.4)
EOSINOPHIL NFR BLD AUTO: 2.1 % (ref 0.3–6.2)
ERYTHROCYTE [DISTWIDTH] IN BLOOD BY AUTOMATED COUNT: 13.4 % (ref 12.3–15.4)
GLOBULIN UR ELPH-MCNC: 2.7 GM/DL
GLUCOSE SERPL-MCNC: 152 MG/DL (ref 65–99)
HBA1C MFR BLD: 7.8 % (ref 4.8–5.6)
HCT VFR BLD AUTO: 41.9 % (ref 34–46.6)
HDLC SERPL-MCNC: 34 MG/DL (ref 40–60)
HGB BLD-MCNC: 13.9 G/DL (ref 12–15.9)
IMM GRANULOCYTES # BLD AUTO: 0.02 10*3/MM3 (ref 0–0.05)
IMM GRANULOCYTES NFR BLD AUTO: 0.3 % (ref 0–0.5)
LDLC SERPL CALC-MCNC: 64 MG/DL (ref 0–100)
LDLC/HDLC SERPL: 1.92 {RATIO}
LYMPHOCYTES # BLD AUTO: 2.33 10*3/MM3 (ref 0.7–3.1)
LYMPHOCYTES NFR BLD AUTO: 35.5 % (ref 19.6–45.3)
MCH RBC QN AUTO: 27.9 PG (ref 26.6–33)
MCHC RBC AUTO-ENTMCNC: 33.2 G/DL (ref 31.5–35.7)
MCV RBC AUTO: 84 FL (ref 79–97)
MICROALBUMIN/CREAT UR: NORMAL MG/G{CREAT}
MONOCYTES # BLD AUTO: 0.42 10*3/MM3 (ref 0.1–0.9)
MONOCYTES NFR BLD AUTO: 6.4 % (ref 5–12)
NEUTROPHILS NFR BLD AUTO: 3.6 10*3/MM3 (ref 1.7–7)
NEUTROPHILS NFR BLD AUTO: 54.8 % (ref 42.7–76)
NRBC BLD AUTO-RTO: 0 /100 WBC (ref 0–0.2)
PLATELET # BLD AUTO: 234 10*3/MM3 (ref 140–450)
PMV BLD AUTO: 10.3 FL (ref 6–12)
POTASSIUM SERPL-SCNC: 4.2 MMOL/L (ref 3.5–5.2)
PROT SERPL-MCNC: 7.2 G/DL (ref 6–8.5)
RBC # BLD AUTO: 4.99 10*6/MM3 (ref 3.77–5.28)
SODIUM SERPL-SCNC: 140 MMOL/L (ref 136–145)
TRIGL SERPL-MCNC: 59 MG/DL (ref 0–150)
TSH SERPL DL<=0.05 MIU/L-ACNC: 1.34 UIU/ML (ref 0.27–4.2)
VLDLC SERPL-MCNC: 13 MG/DL (ref 5–40)
WBC NRBC COR # BLD: 6.57 10*3/MM3 (ref 3.4–10.8)

## 2023-03-29 PROCEDURE — 82570 ASSAY OF URINE CREATININE: CPT | Performed by: FAMILY MEDICINE

## 2023-03-29 PROCEDURE — 85025 COMPLETE CBC W/AUTO DIFF WBC: CPT | Performed by: FAMILY MEDICINE

## 2023-03-29 PROCEDURE — 80053 COMPREHEN METABOLIC PANEL: CPT | Performed by: FAMILY MEDICINE

## 2023-03-29 PROCEDURE — 83036 HEMOGLOBIN GLYCOSYLATED A1C: CPT | Performed by: FAMILY MEDICINE

## 2023-03-29 PROCEDURE — 82043 UR ALBUMIN QUANTITATIVE: CPT | Performed by: FAMILY MEDICINE

## 2023-03-29 PROCEDURE — 80061 LIPID PANEL: CPT | Performed by: FAMILY MEDICINE

## 2023-03-29 PROCEDURE — 84443 ASSAY THYROID STIM HORMONE: CPT | Performed by: FAMILY MEDICINE

## 2023-03-29 RX ORDER — DAPAGLIFLOZIN 10 MG/1
1 TABLET, FILM COATED ORAL DAILY
Qty: 90 TABLET | Refills: 1 | Status: SHIPPED | OUTPATIENT
Start: 2023-03-29

## 2023-03-29 RX ORDER — ATORVASTATIN CALCIUM 80 MG/1
80 TABLET, FILM COATED ORAL DAILY
Qty: 90 TABLET | Refills: 1 | Status: SHIPPED | OUTPATIENT
Start: 2023-03-29

## 2023-03-29 RX ORDER — FLUTICASONE PROPIONATE 0.05 MG/G
1 OINTMENT TOPICAL 2 TIMES DAILY
Qty: 30 G | Refills: 3 | Status: SHIPPED | OUTPATIENT
Start: 2023-03-29

## 2023-03-29 RX ORDER — GABAPENTIN 400 MG/1
CAPSULE ORAL
COMMUNITY
Start: 2023-03-06

## 2023-03-29 RX ORDER — LOSARTAN POTASSIUM 100 MG/1
100 TABLET ORAL DAILY
Qty: 90 TABLET | Refills: 1 | Status: SHIPPED | OUTPATIENT
Start: 2023-03-29

## 2023-03-29 RX ORDER — NITROGLYCERIN 0.4 MG/1
0.4 TABLET SUBLINGUAL
COMMUNITY
Start: 2023-03-08 | End: 2023-03-29

## 2023-03-29 RX ORDER — MONTELUKAST SODIUM 10 MG/1
10 TABLET ORAL DAILY
Qty: 90 TABLET | Refills: 1 | Status: SHIPPED | OUTPATIENT
Start: 2023-03-29

## 2023-03-29 RX ORDER — ISOSORBIDE MONONITRATE 60 MG/1
60 TABLET, EXTENDED RELEASE ORAL DAILY
Qty: 90 TABLET | Refills: 1 | Status: SHIPPED | OUTPATIENT
Start: 2023-03-29

## 2023-03-29 RX ORDER — CLOPIDOGREL BISULFATE 75 MG/1
75 TABLET ORAL DAILY
Qty: 90 TABLET | Refills: 1 | Status: SHIPPED | OUTPATIENT
Start: 2023-03-29

## 2023-03-29 RX ORDER — ASPIRIN 81 MG/1
81 TABLET ORAL DAILY
Qty: 90 TABLET | Refills: 1 | Status: SHIPPED | OUTPATIENT
Start: 2023-03-29

## 2023-03-29 NOTE — PROGRESS NOTES
Venipuncture Blood Specimen Collection  Venipuncture performed in left arm by Fay Ernandez with good hemostasis. Patient tolerated the procedure well without complications.   03/29/23   Fay Ernandez

## 2023-03-29 NOTE — PROGRESS NOTES
"Chief Complaint    Diabetes (Follow-up and med refills), Hyperlipidemia, and Hypertension    Subjective      Ashli Gutierrez presents to [facility]    History of Present Illness    1.) NIDDM : Most recent hemoglobin A1c measured at 7.50% 6 months ago.  No complaints regarding current dose of medications.  No eye complaints.  Stable diabetic neuropathy of feet - managed with gabapentin.    2.) HTN : Stable.  No c/o regarding current dose of anti-hypertensive.    Objective     Vital Signs:     /74 (BP Location: Left arm, Patient Position: Sitting, Cuff Size: Adult)   Pulse 93   Temp 97.3 °F (36.3 °C) (Temporal)   Ht 167.6 cm (66\")   Wt 94.6 kg (208 lb 9.6 oz)   SpO2 95%   BMI 33.67 kg/m²       Physical Exam  Vitals reviewed.   Constitutional:       General: She is not in acute distress.     Appearance: Normal appearance. She is well-developed.   HENT:      Head: Normocephalic and atraumatic.      Right Ear: Hearing and external ear normal.      Left Ear: Hearing and external ear normal.      Nose: Nose normal.   Eyes:      General: Lids are normal.         Right eye: No discharge.         Left eye: No discharge.      Conjunctiva/sclera: Conjunctivae normal.   Cardiovascular:      Rate and Rhythm: Normal rate and regular rhythm.   Pulmonary:      Effort: Pulmonary effort is normal.      Breath sounds: Normal breath sounds.   Abdominal:      General: There is no distension.   Musculoskeletal:         General: No swelling.      Cervical back: Neck supple.   Skin:     Coloration: Skin is not jaundiced.      Findings: No erythema.   Neurological:      Mental Status: She is alert. Mental status is at baseline.   Psychiatric:         Mood and Affect: Mood and affect normal.         Thought Content: Thought content normal.     Assessment and Plan     Diagnoses and all orders for this visit:    1. Type 2 diabetes mellitus with diabetic neuropathy, without long-term current use of " insulin (HCC) (Primary)  Comments:  Labs as noted.  Continue medications as prescribed.  Additional recommendations per review of labs.  Orders:  -     CBC & Differential  -     Comprehensive Metabolic Panel  -     Hemoglobin A1c  -     Lipid Panel  -     Microalbumin / Creatinine Urine Ratio - Urine, Clean Catch    2. Primary hypertension  Comments:  Stable.  Continue antihypertensive as prescribed.    3. Hyperlipidemia, unspecified hyperlipidemia type  Comments:  Repeat cholesterol panel as noted.  Additional recommendations for review.    4. Thyroid disorder  Comments:  Repeat TSH as noted.  Orders:  -     TSH    5. Screening for colon cancer  Comments:  Patient would like to hold off screening at this time.    6. Encounter for screening mammogram for malignant neoplasm of breast  Comments:  Scheduled for June 2023.    7. Need for pneumococcal vaccine  Comments:  Discussed with patient.  We will mail VIS to patient for review.  She will continue to consider.    Other orders  -     aspirin (Aspirin Low Dose) 81 MG EC tablet; Take 1 tablet by mouth Daily.  Dispense: 90 tablet; Refill: 1  -     atorvastatin (LIPITOR) 80 MG tablet; Take 1 tablet by mouth Daily.  Dispense: 90 tablet; Refill: 1  -     clopidogrel (PLAVIX) 75 MG tablet; Take 1 tablet by mouth Daily.  Dispense: 90 tablet; Refill: 1  -     dapagliflozin Propanediol (Farxiga) 10 MG tablet; Take 10 mg by mouth Daily.  Dispense: 90 tablet; Refill: 1  -     fluticasone (CUTIVATE) 0.005 % ointment; Apply 1 application topically to the appropriate area as directed 2 (Two) Times a Day.  Dispense: 30 g; Refill: 3  -     isosorbide mononitrate (IMDUR) 60 MG 24 hr tablet; Take 1 tablet by mouth Daily.  Dispense: 90 tablet; Refill: 1  -     losartan (COZAAR) 100 MG tablet; Take 1 tablet by mouth Daily.  Dispense: 90 tablet; Refill: 1  -     metFORMIN (GLUCOPHAGE) 1000 MG tablet; Take 1 tablet by mouth 2 (Two) Times a Day With Meals.  Dispense: 180 tablet; Refill:  1  -     metoprolol tartrate (LOPRESSOR) 25 MG tablet; Take 1 tablet by mouth 2 (Two) Times a Day.  Dispense: 120 tablet; Refill: 2  -     montelukast (SINGULAIR) 10 MG tablet; Take 1 tablet by mouth Daily.  Dispense: 90 tablet; Refill: 1    Follow Up     Return in about 6 months (around 9/29/2023).    Patient was given instructions and counseling regarding her condition or for health maintenance advice. Please see specific information pulled into the AVS if appropriate.

## 2023-05-30 ENCOUNTER — TELEPHONE (OUTPATIENT)
Dept: FAMILY MEDICINE CLINIC | Facility: CLINIC | Age: 53
End: 2023-05-30

## 2023-05-30 NOTE — TELEPHONE ENCOUNTER
Caller: Ashli Gutierrez    Relationship: Self    Best call back number: [phone]    What is the best time to reach you: ANY    Who are you requesting to speak with (clinical staff, provider,  specific staff member): CLINICAL    Do you know the name of the person who called: PATIENT    What was the call regarding: PATIENT NEEDS HER MEDICATIONS TRANSFERRED OVER TO [facility] IN [place], [place]. [phone].      Is it okay if the provider responds through MyChart: WANTS PHONE CALL.

## 2023-06-12 RX ORDER — CETIRIZINE HYDROCHLORIDE 10 MG/1
TABLET ORAL
Qty: 90 TABLET | Refills: 0 | Status: SHIPPED | OUTPATIENT
Start: 2023-06-12

## 2023-06-12 RX ORDER — LOSARTAN POTASSIUM 100 MG/1
100 TABLET ORAL DAILY
Qty: 90 TABLET | Refills: 1 | Status: SHIPPED | OUTPATIENT
Start: 2023-06-12

## 2023-07-20 ENCOUNTER — TELEPHONE (OUTPATIENT)
Dept: FAMILY MEDICINE CLINIC | Facility: CLINIC | Age: 53
End: 2023-07-20
Payer: COMMERCIAL

## 2023-07-20 NOTE — TELEPHONE ENCOUNTER
Pharmacy called and just wanted to make sure it was on for her to be on Plavix and Prilosec together.  Those two together flagged the pharmacy for a drug interaction.  Please just call the pharmacy back and let the pharmist know.

## 2023-08-04 ENCOUNTER — TELEPHONE (OUTPATIENT)
Dept: FAMILY MEDICINE CLINIC | Facility: CLINIC | Age: 53
End: 2023-08-04
Payer: COMMERCIAL

## 2023-08-04 RX ORDER — PANTOPRAZOLE SODIUM 20 MG/1
20 TABLET, DELAYED RELEASE ORAL DAILY PRN
Qty: 60 TABLET | Refills: 2 | Status: SHIPPED | OUTPATIENT
Start: 2023-08-04 | End: 2023-08-05

## 2023-08-04 NOTE — TELEPHONE ENCOUNTER
Hey!     I recommend that the pt call her insurance and inquire regarding which other medication in the SGLT- agonist group is covered. Her options would be Invokana, Jardiance or Steglatro. Then she can let us know and we can order.     The medication the pharmacist is concerned about his Prilosec and Plavix, since Prilosec might cause Plavix not to work as well.     We can switch her to a H2 blocker like Pepcid or Zantac, if she is agreeable.     Let me know if she has any questions.     Thank you!

## 2023-08-04 NOTE — TELEPHONE ENCOUNTER
Caller: Ashli Gutierrez    Relationship: Self    Best call back number: [phone]     What is the best time to reach you: ANYTIME     Who are you requesting to speak with (clinical staff, provider,  specific staff member): CLINICAL     What was the call regarding: PATIENT IS CALLING IN REGARDS TO dapagliflozin Propanediol (Farxiga) 10 MG tablet , AND REQUESTING FOR A ALTERNATIVE, STATED NEW INSURANCE DOES NOT COVER THE ABOVE MEDICATION. PATIENT IS ALSO INQUIRING ABOUT , TWO MEDICATION INTERACTIONS, WAS ADVISED BY PHARMACY OF INTERACTIONS WITH MEDICATIONS.

## 2023-08-05 RX ORDER — FAMOTIDINE 40 MG/1
40 TABLET, FILM COATED ORAL 2 TIMES DAILY PRN
Qty: 60 TABLET | Refills: 5 | Status: SHIPPED | OUTPATIENT
Start: 2023-08-05

## 2023-08-05 NOTE — TELEPHONE ENCOUNTER
Pepcid sent.  Pharmacy advising againts pantoprazole or any other PPIs due to interaction with her blood thinner.    Thank you!

## 2023-09-07 NOTE — PROGRESS NOTES
Chief Complaint  Nausea and diarrhea     Ashli Gutierrez is a 53 y.o. female who presents to [facility] [place] for nausea and diarrhea     History of present Illness  Establish care for screening colonoscopy and GERD  EGD/Colonoscopy 10/22/2018 by Dr. cMguire - Normal esophagus, stomach, and duodenum.  Normal mucosa throughout colon.    Last seen in office 10/2021 - heartburn controlled on Prilosec. Complaints of intermittent diarrhea. 2-3 loose stools a day. Intermittent nocturnal episodes and fecal incontinence. History of cholecystectomy .   Stool studies ordered, not completed.   Scheduled for EGD and colonoscopy, ultimately cancelled.      Patient presents to office for GERD and diarrhea. Recently switched from Omeprazole to Pantoprazole due to being on Plavix by PCP. Can has some intermittent epigastric pain and nausea. Heartburn mostly controlled. Trialed Pepcid in the past but was not effective. Denies vomiting and dysphagia. Continues to struggle with diarrhea about 4-5 times a week, when diarrhea occurs can be up to 8 times in a day. Bentyl provides relief. Denies melena and hematochezia. Family history of colon cancer in her mother at age 50.     Past Medical History:   Diagnosis Date    Arthritis     Bladder disorder     CAD (coronary artery disease)     Chronic low back pain     GERD (gastroesophageal reflux disease)     Hyperlipidemia     Hypertension     Neurologic disorder     Onychomycosis     Sciatica     Thyroid disorder        Past Surgical History:   Procedure Laterality Date    CAROTID STENT       SECTION      CHOLECYSTECTOMY      COLONOSCOPY      Dr. Caraballo    CORONARY STENT PLACEMENT      UPPER GASTROINTESTINAL ENDOSCOPY  2019    Dr. Caraballo    US GUIDED FINE NEEDLE ASPIRATION  2019         Current Outpatient Medications:     aspirin (Aspirin Low Dose) 81 MG EC tablet, Take 1 tablet by mouth Daily., Disp: 90 tablet, Rfl: 1     atorvastatin (LIPITOR) 80 MG tablet, Take 1 tablet by mouth Daily., Disp: 90 tablet, Rfl: 1    cetirizine (zyrTEC) 10 MG tablet, TAKE 1 TABLET BY MOUTH EVERY DAY, Disp: 90 tablet, Rfl: 0    clopidogrel (PLAVIX) 75 MG tablet, Take 1 tablet by mouth Daily., Disp: 90 tablet, Rfl: 1    dicyclomine (Bentyl) 10 MG capsule, Take 1 capsule by mouth Every 6 (Six) Hours As Needed for Abdominal Cramping (diarrhea)., Disp: 120 capsule, Rfl: 1    fluticasone (CUTIVATE) 0.005 % ointment, Apply 1 application topically to the appropriate area as directed 2 (Two) Times a Day., Disp: 30 g, Rfl: 3    gabapentin (NEURONTIN) 400 MG capsule, , Disp: , Rfl:     HYDROcodone-acetaminophen (NORCO) 5-325 MG per tablet, TAKE ONE TABLET BY MOUTH FOUR TIMES DAILY AS NEEDED FOR PAIN. FILL DATE 10/22/2021, Disp: , Rfl:     isosorbide mononitrate (IMDUR) 60 MG 24 hr tablet, Take 1 tablet by mouth Daily., Disp: 90 tablet, Rfl: 1    Lancets misc, 1 each 2 (Two) Times a Day., Disp: 400 each, Rfl: 2    losartan (COZAAR) 100 MG tablet, TAKE 1 TABLET BY MOUTH DAILY, Disp: 90 tablet, Rfl: 1    metFORMIN (GLUCOPHAGE) 1000 MG tablet, Take 1 tablet by mouth 2 (Two) Times a Day With Meals., Disp: 180 tablet, Rfl: 1    metoprolol tartrate (LOPRESSOR) 25 MG tablet, Take 1 tablet by mouth 2 (Two) Times a Day., Disp: 120 tablet, Rfl: 2    montelukast (SINGULAIR) 10 MG tablet, Take 1 tablet by mouth Daily., Disp: 90 tablet, Rfl: 1    nitroglycerin (Nitrostat) 0.4 MG SL tablet, Take 1 tablet by mouth As Needed for Chest Pain., Disp: 15 tablet, Rfl: 3    ondansetron (ZOFRAN) 4 MG tablet, Take 1 tablet by mouth Every 8 (Eight) Hours As Needed for Nausea or Vomiting., Disp: 15 tablet, Rfl: 2    pantoprazole (PROTONIX) 40 MG EC tablet, Take 20 mg by mouth Daily., Disp: , Rfl:     tiZANidine (ZANAFLEX) 4 MG tablet, TAKE 1 TABLET BY MOUTH EVERY 8 HOURS AS NEEDED FOR MUSCLE SPASMS, Disp: 45 tablet, Rfl: 2    dapagliflozin Propanediol (Farxiga) 10 MG tablet, Take 10 mg by  "mouth Daily. (Patient not taking: Reported on 9/8/2023), Disp: 90 tablet, Rfl: 1    famotidine (Pepcid) 40 MG tablet, Take 1 tablet by mouth 2 (Two) Times a Day As Needed for Heartburn. (Patient not taking: Reported on 9/8/2023), Disp: 60 tablet, Rfl: 5    PEG 3350-KCl-NaBcb-NaCl-NaSulf (Golytely) 227.1 g pack, Take 4,000 mL by mouth 1 (One) Time for 1 dose. Take as directed by the office for colon prep, Disp: 1 each, Rfl: 0     Allergies   Allergen Reactions    Naproxen Shortness Of Breath and Hives    Penicillins Shortness Of Breath and Hives    Adhesive Tape Rash     tegaderm Skin irritation     Ciprofloxacin Hives     Shortness of breath    Contrast Dye (Echo Or Unknown Ct/Mr) Hives     Shortness of breath    Iodinated Contrast Media Rash    Metronidazole Hives     Shortness of breath       Family History   Problem Relation Age of Onset    Colon cancer Mother 52    Colon polyps Mother         Social History     Social History Narrative    Not on file       Objective       Vital Signs:   /87 (BP Location: Left arm, Patient Position: Sitting, Cuff Size: Adult)   Pulse 70   Ht 167.6 cm (65.98\")   Wt 94.3 kg (208 lb)   SpO2 100%   BMI 33.59 kg/m²       Physical Exam  Constitutional:       Appearance: Normal appearance.   HENT:      Head: Normocephalic.   Cardiovascular:      Rate and Rhythm: Normal rate and regular rhythm.      Heart sounds: Normal heart sounds.   Pulmonary:      Effort: Pulmonary effort is normal.      Breath sounds: Normal breath sounds.   Abdominal:      General: Bowel sounds are normal.      Palpations: Abdomen is soft.   Skin:     General: Skin is warm and dry.   Neurological:      Mental Status: She is alert and oriented to person, place, and time. Mental status is at baseline.   Psychiatric:         Mood and Affect: Mood normal.         Behavior: Behavior normal.         Thought Content: Thought content normal.         Judgment: Judgment normal.       Result Review :       CBC " w/diff          3/29/2023    08:19   CBC w/Diff   WBC 6.57    RBC 4.99    Hemoglobin 13.9    Hematocrit 41.9    MCV 84.0    MCH 27.9    MCHC 33.2    RDW 13.4    Platelets 234    Neutrophil Rel % 54.8    Immature Granulocyte Rel % 0.3    Lymphocyte Rel % 35.5    Monocyte Rel % 6.4    Eosinophil Rel % 2.1    Basophil Rel % 0.9      CMP          3/29/2023    08:19   CMP   Glucose 152    BUN 14    Creatinine 0.55    EGFR 110.4    Sodium 140    Potassium 4.2    Chloride 101    Calcium 9.0    Total Protein 7.2    Albumin 4.5    Globulin 2.7    Total Bilirubin 0.5    Alkaline Phosphatase 89    AST (SGOT) 18    ALT (SGPT) 17    Albumin/Globulin Ratio 1.7    BUN/Creatinine Ratio 25.5    Anion Gap 11.0                  Assessment and Plan    Diagnoses and all orders for this visit:    1. Gastroesophageal reflux disease without esophagitis (Primary)  -     Case Request; Standing  -     Implement Anesthesia orders day of procedure.; Standing  -     Obtain informed consent; Standing  -     Verify NPO; Standing  -     Verify bowel prep was successful; Standing  -     Give tap water enema if bowel prep was insufficient; Standing  -     Case Request    2. Diarrhea, unspecified type  -     Case Request; Standing  -     Implement Anesthesia orders day of procedure.; Standing  -     Obtain informed consent; Standing  -     Verify NPO; Standing  -     Verify bowel prep was successful; Standing  -     Give tap water enema if bowel prep was insufficient; Standing  -     Case Request    3. Family history of colon cancer  -     Case Request; Standing  -     Implement Anesthesia orders day of procedure.; Standing  -     Obtain informed consent; Standing  -     Verify NPO; Standing  -     Verify bowel prep was successful; Standing  -     Give tap water enema if bowel prep was insufficient; Standing  -     Case Request    Other orders  -     dicyclomine (Bentyl) 10 MG capsule; Take 1 capsule by mouth Every 6 (Six) Hours As Needed for  Abdominal Cramping (diarrhea).  Dispense: 120 capsule; Refill: 1  -     PEG 3350-KCl-NaBcb-NaCl-NaSulf (Golytely) 227.1 g pack; Take 4,000 mL by mouth 1 (One) Time for 1 dose. Take as directed by the office for colon prep  Dispense: 1 each; Refill: 0    Continue Protonix 20mg daily  Refill Bentyl, educated patient on how to utilize medication.   Cardiac and blood thinner (Plavix) from Dr. Butch Whaley  EGD/COLONOSCOPY Surgical Risk and Benefits: Possible risk/complications, benefits, and alternatives to surgical or invasive procedure have been explained to patient and/or legal guardian. Risks include bleeding, infection, and perforation. Patient has been evaluated and can tolerate anesthesia and/or sedation. Risk, benefits, and alternatives to anesthesia and sedation have been explained to patient and/or legal guardian.       Follow Up   No follow-ups on file.  Patient was given instructions and counseling regarding her condition or for health maintenance advice. Please see specific information pulled into the AVS if appropriate.

## 2023-09-08 ENCOUNTER — TELEPHONE (OUTPATIENT)
Dept: GASTROENTEROLOGY | Facility: CLINIC | Age: 53
End: 2023-09-08
Payer: COMMERCIAL

## 2023-09-08 ENCOUNTER — OFFICE VISIT (OUTPATIENT)
Dept: GASTROENTEROLOGY | Facility: CLINIC | Age: 53
End: 2023-09-08
Payer: COMMERCIAL

## 2023-09-08 VITALS
OXYGEN SATURATION: 100 % | WEIGHT: 208 LBS | DIASTOLIC BLOOD PRESSURE: 87 MMHG | HEART RATE: 70 BPM | BODY MASS INDEX: 33.43 KG/M2 | SYSTOLIC BLOOD PRESSURE: 137 MMHG | HEIGHT: 66 IN

## 2023-09-08 DIAGNOSIS — R19.7 DIARRHEA, UNSPECIFIED TYPE: ICD-10-CM

## 2023-09-08 DIAGNOSIS — Z80.0 FAMILY HISTORY OF COLON CANCER: ICD-10-CM

## 2023-09-08 DIAGNOSIS — K21.9 GASTROESOPHAGEAL REFLUX DISEASE WITHOUT ESOPHAGITIS: Primary | ICD-10-CM

## 2023-09-08 RX ORDER — PANTOPRAZOLE SODIUM 40 MG/1
20 TABLET, DELAYED RELEASE ORAL DAILY
COMMUNITY
Start: 2023-09-01

## 2023-09-08 RX ORDER — POLYETHYLENE GLYCOL 3350, SODIUM SULFATE ANHYDROUS, SODIUM BICARBONATE, SODIUM CHLORIDE, POTASSIUM CHLORIDE 227.1; 21.5; 6.36; 5.53; .754 G/L; G/L; G/L; G/L; G/L
4000 POWDER, FOR SOLUTION ORAL ONCE
Qty: 1 EACH | Refills: 0 | Status: SHIPPED | OUTPATIENT
Start: 2023-09-08 | End: 2023-09-08

## 2023-09-08 RX ORDER — DICYCLOMINE HYDROCHLORIDE 10 MG/1
10 CAPSULE ORAL EVERY 6 HOURS PRN
Qty: 120 CAPSULE | Refills: 1 | Status: SHIPPED | OUTPATIENT
Start: 2023-09-08

## 2023-09-08 NOTE — TELEPHONE ENCOUNTER
Pt scheduled for egd/colon on 11/20/23, needs cardiac/blood thinner-plavix clearance sent to Dr Butch browne/[facility] fax :[phone] by or around 9/20/23

## 2023-09-15 ENCOUNTER — OFFICE VISIT (OUTPATIENT)
Dept: FAMILY MEDICINE CLINIC | Facility: CLINIC | Age: 53
End: 2023-09-15
Payer: COMMERCIAL

## 2023-09-15 VITALS
OXYGEN SATURATION: 95 % | WEIGHT: 206 LBS | HEART RATE: 76 BPM | DIASTOLIC BLOOD PRESSURE: 72 MMHG | SYSTOLIC BLOOD PRESSURE: 128 MMHG | BODY MASS INDEX: 33.11 KG/M2 | TEMPERATURE: 97.7 F | HEIGHT: 66 IN

## 2023-09-15 DIAGNOSIS — Z12.31 SCREENING MAMMOGRAM FOR BREAST CANCER: ICD-10-CM

## 2023-09-15 DIAGNOSIS — Z72.820 POOR SLEEP: ICD-10-CM

## 2023-09-15 DIAGNOSIS — Z23 NEED FOR PNEUMOCOCCAL VACCINE: ICD-10-CM

## 2023-09-15 DIAGNOSIS — Z23 NEED FOR SHINGLES VACCINE: ICD-10-CM

## 2023-09-15 DIAGNOSIS — Z00.00 ANNUAL PHYSICAL EXAM: Primary | ICD-10-CM

## 2023-09-15 DIAGNOSIS — R06.81 APNEIC EPISODE: ICD-10-CM

## 2023-09-15 DIAGNOSIS — E11.40 TYPE 2 DIABETES MELLITUS WITH DIABETIC NEUROPATHY, WITHOUT LONG-TERM CURRENT USE OF INSULIN: ICD-10-CM

## 2023-09-15 DIAGNOSIS — E66.09 CLASS 1 OBESITY DUE TO EXCESS CALORIES WITH BODY MASS INDEX (BMI) OF 33.0 TO 33.9 IN ADULT, UNSPECIFIED WHETHER SERIOUS COMORBIDITY PRESENT: ICD-10-CM

## 2023-09-15 PROCEDURE — 99396 PREV VISIT EST AGE 40-64: CPT | Performed by: FAMILY MEDICINE

## 2023-09-15 RX ORDER — ATORVASTATIN CALCIUM 80 MG/1
80 TABLET, FILM COATED ORAL DAILY
Qty: 90 TABLET | Refills: 1 | Status: SHIPPED | OUTPATIENT
Start: 2023-09-15

## 2023-09-15 RX ORDER — CLOPIDOGREL BISULFATE 75 MG/1
75 TABLET ORAL DAILY
Qty: 90 TABLET | Refills: 1 | Status: SHIPPED | OUTPATIENT
Start: 2023-09-15

## 2023-09-15 NOTE — PROGRESS NOTES
"Chief Complaint    Annual Exam (Wellness physical, labs, med refills), Diabetes, Hyperlipidemia, Hypertension, and Sleep Apnea (Is wanting a sleep study)    Subjective      Ashli Gutierrez presents to [facility]    History of Present Illness    1.) ANNUAL EXAM : No labs today.  Most recent labs completed approximately 5 months ago.  Patient presents with no significant complaints regarding her current medications except Farxiga, which she has been advised is not covered per her insurance.  She is also complaining of poor sleep.  Admits to what she describes as suspected apneic episodes.  She is requesting referral to sleep medicine.  She is also reporting daytime fatigue.    Objective     Vital Signs:     /72 (BP Location: Left arm, Patient Position: Sitting, Cuff Size: Adult)   Pulse 76   Temp 97.7 °F (36.5 °C) (Temporal)   Ht 167.6 cm (66\")   Wt 93.4 kg (206 lb)   SpO2 95%   BMI 33.25 kg/m²       Physical Exam  Vitals reviewed.   Constitutional:       General: She is not in acute distress.     Appearance: Normal appearance. She is well-developed.   HENT:      Head: Normocephalic and atraumatic.      Right Ear: Hearing, tympanic membrane and external ear normal.      Left Ear: Hearing, tympanic membrane and external ear normal.      Nose: Nose normal. No congestion.      Mouth/Throat:      Pharynx: No oropharyngeal exudate.   Eyes:      General: Lids are normal.         Right eye: No discharge.         Left eye: No discharge.      Conjunctiva/sclera: Conjunctivae normal.   Cardiovascular:      Rate and Rhythm: Normal rate and regular rhythm.   Pulmonary:      Effort: Pulmonary effort is normal.      Breath sounds: Normal breath sounds.   Abdominal:      General: There is no distension.   Musculoskeletal:         General: No swelling.      Cervical back: Neck supple.   Skin:     Coloration: Skin is not jaundiced.      Findings: No erythema.   Neurological:      Mental " Status: She is alert. Mental status is at baseline.   Psychiatric:         Mood and Affect: Mood and affect normal.         Thought Content: Thought content normal.     BMI is >= 30 and <35. (Class 1 Obesity). The following options were offered after discussion;: Patient admits to lack of exercise due to chronic back pain. She also reports that she has been attempting to adjust her food choices.     Assessment and Plan     Diagnoses and all orders for this visit:    1. Annual physical exam (Primary)  Comments:  Patient will return in 1 month for diabetic labs as they were obtained 5 months ago.    2. Type 2 diabetes mellitus with diabetic neuropathy, without long-term current use of insulin  -     CBC & Differential; Future  -     Comprehensive Metabolic Panel; Future  -     Lipid Panel; Future  -     Hemoglobin A1c; Future    3. Poor sleep  Comments:  Referred to sleep medicine for eval recommendations.  Orders:  -     Cancel: Ambulatory Referral to Sleep Medicine  -     Ambulatory Referral to Sleep Medicine    4. Apneic episode    5. Screening mammogram for breast cancer  Comments:  Patient will call the [facility] to schedule.    6. Class 1 obesity due to excess calories with body mass index (BMI) of 33.0 to 33.9 in adult, unspecified whether serious comorbidity present  Comments:  We will continue to  regarding diet adjustments.    7. Need for shingles vaccine  Comments:  Patient will obtain at her pharmacy.    8. Need for pneumococcal vaccine  Comments:  To get the vaccine, but patient would like to hold off until she returns for repeat labs.    Other orders  -     atorvastatin (LIPITOR) 80 MG tablet; Take 1 tablet by mouth Daily.  Dispense: 90 tablet; Refill: 1  -     clopidogrel (PLAVIX) 75 MG tablet; Take 1 tablet by mouth Daily.  Dispense: 90 tablet; Refill: 1  -     empagliflozin (JARDIANCE) 10 MG tablet tablet; Take 1 tablet by mouth Daily.  Dispense: 90 tablet; Refill: 1    Follow Up : 7  mos.    Patient was given instructions and counseling regarding her condition or for health maintenance advice. Please see specific information pulled into the AVS if appropriate.

## 2023-09-22 RX ORDER — ISOSORBIDE MONONITRATE 60 MG/1
60 TABLET, EXTENDED RELEASE ORAL DAILY
Qty: 90 TABLET | Refills: 1 | Status: SHIPPED | OUTPATIENT
Start: 2023-09-22

## 2023-09-22 RX ORDER — ASPIRIN 81 MG/1
81 TABLET ORAL DAILY
Qty: 90 TABLET | Refills: 1 | Status: SHIPPED | OUTPATIENT
Start: 2023-09-22

## 2023-09-22 RX ORDER — LOSARTAN POTASSIUM 100 MG/1
100 TABLET ORAL DAILY
Qty: 90 TABLET | Refills: 1 | Status: SHIPPED | OUTPATIENT
Start: 2023-09-22

## 2023-09-22 RX ORDER — FLUTICASONE PROPIONATE 0.05 MG/G
1 OINTMENT TOPICAL 2 TIMES DAILY
Qty: 30 G | Refills: 3 | Status: SHIPPED | OUTPATIENT
Start: 2023-09-22

## 2023-09-22 RX ORDER — MONTELUKAST SODIUM 10 MG/1
10 TABLET ORAL DAILY
Qty: 90 TABLET | Refills: 1 | Status: SHIPPED | OUTPATIENT
Start: 2023-09-22

## 2023-09-22 RX ORDER — CETIRIZINE HYDROCHLORIDE 10 MG/1
10 TABLET ORAL DAILY
Qty: 90 TABLET | Refills: 0 | Status: SHIPPED | OUTPATIENT
Start: 2023-09-22 | End: 2023-12-21

## 2023-09-22 NOTE — TELEPHONE ENCOUNTER
Patient was seen on 09/15 and states that only a portion of her refills were sent in.  Is requesting refills for her additional medications on her list.

## 2023-09-23 RX ORDER — LOSARTAN POTASSIUM 100 MG/1
100 TABLET ORAL DAILY
Qty: 90 TABLET | Refills: 1 | OUTPATIENT
Start: 2023-09-23

## 2023-10-13 ENCOUNTER — OFFICE VISIT (OUTPATIENT)
Dept: SLEEP MEDICINE | Facility: HOSPITAL | Age: 53
End: 2023-10-13
Payer: COMMERCIAL

## 2023-10-13 VITALS
HEART RATE: 71 BPM | DIASTOLIC BLOOD PRESSURE: 88 MMHG | BODY MASS INDEX: 33.38 KG/M2 | OXYGEN SATURATION: 92 % | SYSTOLIC BLOOD PRESSURE: 125 MMHG | HEIGHT: 66 IN | WEIGHT: 207.7 LBS

## 2023-10-13 DIAGNOSIS — G47.19 EXCESSIVE DAYTIME SLEEPINESS: ICD-10-CM

## 2023-10-13 DIAGNOSIS — I10 ESSENTIAL HYPERTENSION: ICD-10-CM

## 2023-10-13 DIAGNOSIS — R29.818 SUSPECTED SLEEP APNEA: Primary | ICD-10-CM

## 2023-10-13 DIAGNOSIS — M54.50 CHRONIC LOW BACK PAIN, UNSPECIFIED BACK PAIN LATERALITY, UNSPECIFIED WHETHER SCIATICA PRESENT: ICD-10-CM

## 2023-10-13 DIAGNOSIS — G89.29 CHRONIC LOW BACK PAIN, UNSPECIFIED BACK PAIN LATERALITY, UNSPECIFIED WHETHER SCIATICA PRESENT: ICD-10-CM

## 2023-10-13 DIAGNOSIS — I25.10 CORONARY ARTERY DISEASE, UNSPECIFIED VESSEL OR LESION TYPE, UNSPECIFIED WHETHER ANGINA PRESENT, UNSPECIFIED WHETHER NATIVE OR TRANSPLANTED HEART: ICD-10-CM

## 2023-10-13 DIAGNOSIS — R06.83 LOUD SNORING: ICD-10-CM

## 2023-10-13 DIAGNOSIS — R06.81 WITNESSED EPISODE OF APNEA: ICD-10-CM

## 2023-10-13 PROCEDURE — G0463 HOSPITAL OUTPT CLINIC VISIT: HCPCS

## 2023-10-13 RX ORDER — POLYETHYLENE GLYCOL-3350 AND ELECTROLYTES 236; 6.74; 5.86; 2.97; 22.74 G/274.31G; G/274.31G; G/274.31G; G/274.31G; G/274.31G
POWDER, FOR SOLUTION ORAL
COMMUNITY
Start: 2023-09-18

## 2023-10-13 RX ORDER — ACETAMINOPHEN 325 MG/1
650 TABLET ORAL EVERY 6 HOURS PRN
COMMUNITY

## 2023-10-13 NOTE — PROGRESS NOTES
[facility]  [address] [place] [address]  [place]   [place] [address]  Phone: [phone]  Fax: [phone]      Ashli Gutierrez  [id number]   1970  53 y.o.  female      Referring physician/provider and PCP Jason Yousif DO    Type of service: Initial Sleep Medicine Consult.  Date of service: 10/13/2023      Chief Complaint   Patient presents with    Snoring       History of present illness;  Thank you for asking to see Ashli Gutierrez, 53 y.o. PMHx of HTN, CAD (status post PCI x4 stents last in 2016), Chronic back pain (on hydrocodonoe QID) diabetes, neuropathy, GERD. The patient was seen today on 10/13/2023 at [facility].  The patient presents today with symptoms of snoring, non-restorative sleep and witnessed apneas. The symptoms are present for greater than 1 year and they are persistent in nature.  The snoring is present in all positions and it is loud.  Patient  denies prior surgery namely tonsillectomy, nasal surgery or UPPP.       -Loud snoring, witnessed apneas >1 year   +excessive daytime sleepiness   Denies near miss or MVC 2/2 drowsiness   Never had sleep study       -Chronic back pain   4x a day  hydrocodone on same for 4 years - by pain management     Obstructive Sleep Apnea Screening: STOP-BANG Sleep Apnea Questionnaire. Reference: Enrique F et al. Br J Anaesth, 2012.     Criterion    Yes    No  Do you SNORE loudly?   [x]   Yes  []   No   Do you often feel TIRED, fatigued, or sleepy during the day?    [x]   Yes  []   No  Has anyone OBSERVED you stop breathing during your sleep?    [x]   Yes  []   No  Do you have or are you being treated for high blood PRESSURE?    [x]   Yes  []   No  BMI >32 kg/m2     [x]   Yes  []   No  AGE > 50 years    [x]   Yes  []   No  NECK circumference >16 inches / 40 cm    []   Yes  [x]   No  GENDER: male     []   Yes  [x]   No    NITISH Probability:  []   1-2 - Low  []   3-4 - Intermediate  [x]   5-8 - High      Further Sleep  no rashes, suspicious lesions, no jaundice present History:    Bedtime: 10 PM to 1 am   Rise Time: 9 AM  Sleep Latency: Less than 15 minutes  Screens in bed:  No  Wake after sleep onset: 3x  Reasons for awakenings: nocturia or gasping for air non-nightly  goes right back to sleep   Number of naps per day 1/day  Naps restorative:  no   Caffeine use: 0    RLS Symptoms: No   Bruxism:No   Current sleep related gastroesophageal reflux symptoms:  No   Cataplexy:  No   Sleep Paralysis:  No   Hypnagogic or hypnopompic hallucinations: No   Parasomnias such as sleep walking or sleep eating No     Disclaimer Sleep History: The above sleep history is based on this sleep physician's in room encounter with the patient. Pre encounter self administered questionnaires are taken into consideration and discussed with patient for any discordance. The above documentation by this sleep physician is the most accurate clinical information determined by in room sleep physician encounter with patient.     MEDICAL CONDITIONS (PMH)   Hypertension  CAD/post PCI 2013 (x4 stents last in 2016)  Diabetes  Neuropathy  Arthritis  GERD    Social history:  Do you drive a commercial vehicle:  No   Shift work:  No   Tobacco use:  Yes 2 packs/day for from age 21 to age 44  Alcohol use: 0 per week  Occupation: Not    Family Hx (parents and siblings) (pertaining to sleep medicine)  Mother and Sister - Sleep Apnea     Medications: reviewed    Review of systems is negative unless otherwise noted per HPI   Disclaimer History: The above history is based on this sleep physician's in room encounter with the patient. Pre encounter self administered questionnaires are taken into consideration and discussed with patient for any discordance. The above documentation by this sleep physician is the most accurate clinical information determined by in room sleep physician encounter with patient.     Physical exam:  Vitals:    10/13/23 0900   BP: 125/88   Pulse: 71   SpO2: 92%   Weight: 94.2 kg (207 lb 11.2 oz)   Height:  "167.6 cm (66\")    Body mass index is 33.52 kg/mý.   CONSTITUTIONAL:  Non-toxic, In no overt distress   Head: normocephalic   ENT: Mallampati class IV, + macroglossia, no septal defects   NECK:Neck Circumference: 14.5 inches,no nuchal rigidity  RESPIRATORY SYSTEM: Breath sounds are clear (no rales, no rhonchi, no wheezes), no accessory muscle use  CARDIOVASULAR SYSTEM: Heart sounds are regular rhythm and normal rate, no rub, no gallop, no edema  NEUROLOGICAL SYSTEM: Oriented x 3, No gross focal deficits   PSYCHIATRIC SYSTEM: Goal oriented, affect full range appropriate      Office notes from care team reviewed:    -9/15/23 Office Visit Dr. Yousif states sleep apnea is wanting a sleep study is complaining of poor sleep admits to suspected apneic episodes    Labs reviewed.  TSH          3/29/2023    08:19   TSH   TSH 1.340       Most Recent A1C          3/29/2023    08:19   HGBA1C Most Recent   Hemoglobin A1C 7.80             Assessment and plan:  Suspected sleep apnea [R29.818] patient's symptoms and examination is consistent with sleep apnea (G47.30). I have talked to the patient about the signs and symptoms of sleep apnea. In addition, I have also discussed pathophysiology of sleep apnea.  I also discussed the complications of untreated sleep apnea including effects on hypertension, diabetes mellitus and nonrestorative sleep with hypersomnia which can increase risk for motor vehicle accidents.  Untreated sleep apnea is also a risk factor for development of atrial fibrillation, hypertension, insulin resistance and cerebrovascular accident.  Discussed in detail of various testing methods including home-based and lab based sleep studies.  Based on history and physical examination and other comorbidities the most appropriate study is to order SPLIT AHI > 15 in laboratory polysomnography, rather than home sleep apnea testing,to rule out diagnosis of sleep apnea per AASM clinical practice guidelines (doi:10.5664/jcsm.6506) " secondary to patient's history of  significant cardiorespiratory disease (CAD s/p 4x stents last in 2014), chronic pain on oxycodone QID.  The order for the sleep study is placed in [facility].  The test will be scheduled after approval from insurance. Treatment and management will be discussed after the test is completed.  Patient was given opportunity to ask questions and all the questions were answered.   Snoring (R06.83), snoring is the sound created by turbulent airflow vibrating upper airway soft tissue due to limitation of inspiratory airflow. I have also discussed factors affecting snoring including sleep deprivation, sleeping on the back and alcohol ingestion. To minimize snoring, patient is advised to have adequate sleep, sleep on the side and avoid alcohol and sedative medications before bedtime  Excessive daytime sleepiness .  Patient endorses subjective excessive daytime sleepiness with sleep physician encounter which was consistent with patient's pre-encounter self-administered [place] Sleepiness Scale of Total score: 16.  There are many causes for daytime excessive sleepiness including sleep depression, shiftwork syndrome, depression and other medical disorders including heart, kidney and liver failure.  The most serious cause of excessive sleepiness is due to neurological conditions like narcolepsy/cataplexy.  But the most common cause of excessive sleepiness is due to sleep apnea with frequent awakenings during sleep time.  I have discussed safety of driving and to remain vigilant while driving.  Obesity, patient's BMI is Body mass index is 33.52 kg/mý.. I have discussed the relationship between weight and sleep apnea.There is direct correlation between weight and severity of sleep apnea.  Weight reduction is encouraged, as it is going to reduce the severity of sleep apnea. I have also discussed with the patient diet and exercise to achieve ideal body weight.  CAD s/p PCi x4,  In lab sleep study indicated  belia. Follow up with PCP and cardiologist as previous.  Chronic pain, on opoid therapy in lab sleep study as above. Counseled at risk for central sleep apnea with same follow up with her prescribing physician to discuss risks/benefits of opoid.   HTN, Follow up with primary care physician for continued management. This medical condition would make the patient eligible for a trial of PAP therapy even if sleep study reveals mild severity sleep apnea.    I have also discussed with the patient the following  Sleep hygiene: Maintaining a regular bedtime and wake time, not to watch television or work in bed, limit caffeine-containing beverages before bed time and avoid naps during the day  Adequate amount of sleep.  Generally most people needs about 7 to 8 hours of sleep.      Return for 31 to 90 days after PAP setup with down load.  Patient's questions were answered      I once again thank you for asking me to see this patient in consultation and I have forwarded my opinion and treatment plan.  Please do not hesitate to call me if you have any questions.       EMR Dragon/Transcription disclaimer:   Much of this encounter note is an electronic transcription/translation of spoken language to printed text. The electronic translation of spoken language may permit erroneous, or at times, nonsensical words or phrases to be inadvertently transcribed; Although I have reviewed the note for such errors, some may still exist.     NPI #: [id number]    Awilda Allen, DO  Sleep Medicine  [facility]  10/13/23

## 2023-11-13 NOTE — PROGRESS NOTES
"Chief Complaint    NIDDM     Subjective      Ashli Gutierrez presents to [facility]     History of Present Illness    1.) NIDDM : Patient presents for review of most recent labs. A1C - 7.46% - she is prescribed Metformin at max dose. Jardiance noted on patient's list of medication, but she reports that she has not been taking the medication due to price. Her remaining labs were unremarkable, except for her HDL measured at 32.    Objective      Vital Signs:     /72 (BP Location: Left arm, Patient Position: Sitting, Cuff Size: Adult)   Pulse 94   Temp 96.4 °F (35.8 °C) (Temporal)   Ht 167.6 cm (66\")   Wt 96.2 kg (212 lb)   SpO2 95%   BMI 34.22 kg/m²       Physical Exam  Vitals reviewed.   Constitutional:       General: She is not in acute distress.     Appearance: Normal appearance. She is well-developed.   HENT:      Head: Normocephalic and atraumatic.      Right Ear: Hearing and external ear normal.      Left Ear: Hearing and external ear normal.      Nose: Nose normal.   Eyes:      General: Lids are normal.         Right eye: No discharge.         Left eye: No discharge.      Conjunctiva/sclera: Conjunctivae normal.   Pulmonary:      Effort: Pulmonary effort is normal.   Abdominal:      General: There is no distension.   Musculoskeletal:         General: No swelling.      Cervical back: Neck supple.   Skin:     Coloration: Skin is not jaundiced.      Findings: No erythema.   Neurological:      Mental Status: She is alert. Mental status is at baseline.   Psychiatric:         Mood and Affect: Mood and affect normal.         Thought Content: Thought content normal.     Assessment and Plan    Diagnoses and all orders for this visit:    1. Non-insulin dependent type 2 diabetes mellitus (CMS/HCC) (Primary)  Comments:  1.) Continue Metformin at current dose. Start Farxiga as noted. Expect to follow up in 3 months.     2. Hyperlipidemia, unspecified hyperlipidemia " type  Comments:  1.) Lipid panel discussed with patient. Recommend healthy diet an at least 150 mins of aerobic exercise weekly.    Other orders  -     Dapagliflozin Propanediol (Farxiga) 10 MG tablet; Take 10 mg by mouth Daily.  Dispense: 90 tablet; Refill: 0    Follow Up     Return in about 3 months (around 11/27/2021) for NIDDM/CHRONIC CONDITIONS.     Patient was given instructions and counseling regarding her condition or for health maintenance advice. Please see specific information pulled into the AVS if appropriate.        0

## 2023-11-20 RX ORDER — OMEPRAZOLE 20 MG/1
CAPSULE, DELAYED RELEASE ORAL
Qty: 90 CAPSULE | Refills: 1 | OUTPATIENT
Start: 2023-11-20

## 2023-12-12 ENCOUNTER — HOSPITAL ENCOUNTER (OUTPATIENT)
Dept: SLEEP MEDICINE | Facility: HOSPITAL | Age: 53
Discharge: HOME | End: 2023-12-12
Admitting: FAMILY MEDICINE
Payer: COMMERCIAL

## 2023-12-12 DIAGNOSIS — R06.81 WITNESSED EPISODE OF APNEA: ICD-10-CM

## 2023-12-12 DIAGNOSIS — M54.50 CHRONIC LOW BACK PAIN, UNSPECIFIED BACK PAIN LATERALITY, UNSPECIFIED WHETHER SCIATICA PRESENT: ICD-10-CM

## 2023-12-12 DIAGNOSIS — G47.19 EXCESSIVE DAYTIME SLEEPINESS: ICD-10-CM

## 2023-12-12 DIAGNOSIS — R29.818 SUSPECTED SLEEP APNEA: ICD-10-CM

## 2023-12-12 DIAGNOSIS — R06.83 LOUD SNORING: ICD-10-CM

## 2023-12-12 DIAGNOSIS — G89.29 CHRONIC LOW BACK PAIN, UNSPECIFIED BACK PAIN LATERALITY, UNSPECIFIED WHETHER SCIATICA PRESENT: ICD-10-CM

## 2023-12-12 DIAGNOSIS — I25.10 CORONARY ARTERY DISEASE, UNSPECIFIED VESSEL OR LESION TYPE, UNSPECIFIED WHETHER ANGINA PRESENT, UNSPECIFIED WHETHER NATIVE OR TRANSPLANTED HEART: ICD-10-CM

## 2023-12-12 PROCEDURE — 95810 POLYSOM 6/> YRS 4/> PARAM: CPT

## 2023-12-15 DIAGNOSIS — G47.33 OBSTRUCTIVE SLEEP APNEA, ADULT: Primary | ICD-10-CM

## 2023-12-15 DIAGNOSIS — I10 ESSENTIAL HYPERTENSION: ICD-10-CM

## 2023-12-19 RX ORDER — NITROGLYCERIN 0.4 MG/1
0.4 TABLET SUBLINGUAL AS NEEDED
Qty: 15 TABLET | Refills: 3 | Status: SHIPPED | OUTPATIENT
Start: 2023-12-19

## 2023-12-19 RX ORDER — TIZANIDINE 4 MG/1
4 TABLET ORAL EVERY 8 HOURS PRN
Qty: 45 TABLET | Refills: 2 | Status: SHIPPED | OUTPATIENT
Start: 2023-12-19

## 2023-12-19 NOTE — TELEPHONE ENCOUNTER
Caller: Gutierrez Ashli S    Relationship: Self    Best call back number: [phone]     Requested Prescriptions:   Requested Prescriptions     Pending Prescriptions Disp Refills    nitroglycerin (Nitrostat) 0.4 MG SL tablet 15 tablet 3     Sig: Take 1 tablet by mouth As Needed for Chest Pain.    tiZANidine (ZANAFLEX) 4 MG tablet 45 tablet 2     Sig: Take 1 tablet by mouth Every 8 (Eight) Hours As Needed for Muscle Spasms.        Pharmacy where request should be sent: [facility], [facility], [facility], [place] - [address] - [phone]  - [phone] FX     Last office visit with prescribing clinician: 9/15/2023   Last telemedicine visit with prescribing clinician: Visit date not found   Next office visit with prescribing clinician: Visit date not found     Does the patient have less than a 3 day supply:  [x] Yes  [] No    William Chawla Rep   12/19/23 11:43 EST

## 2023-12-21 ENCOUNTER — TELEPHONE (OUTPATIENT)
Dept: SLEEP MEDICINE | Facility: HOSPITAL | Age: 53
End: 2023-12-21
Payer: COMMERCIAL

## 2023-12-22 ENCOUNTER — TELEPHONE (OUTPATIENT)
Dept: SLEEP MEDICINE | Facility: HOSPITAL | Age: 53
End: 2023-12-22
Payer: COMMERCIAL

## 2024-01-08 RX ORDER — PANTOPRAZOLE SODIUM 40 MG/1
40 TABLET, DELAYED RELEASE ORAL DAILY
Qty: 30 TABLET | Refills: 0 | OUTPATIENT
Start: 2024-01-08

## 2024-01-08 RX ORDER — CETIRIZINE HYDROCHLORIDE 10 MG/1
10 TABLET ORAL DAILY
Qty: 90 TABLET | Refills: 0 | Status: SHIPPED | OUTPATIENT
Start: 2024-01-08

## 2024-01-18 ENCOUNTER — OFFICE VISIT (OUTPATIENT)
Dept: FAMILY MEDICINE CLINIC | Facility: CLINIC | Age: 54
End: 2024-01-18
Payer: COMMERCIAL

## 2024-01-18 VITALS
HEIGHT: 66 IN | SYSTOLIC BLOOD PRESSURE: 155 MMHG | TEMPERATURE: 96.1 F | HEART RATE: 83 BPM | BODY MASS INDEX: 33.59 KG/M2 | OXYGEN SATURATION: 99 % | WEIGHT: 209 LBS | DIASTOLIC BLOOD PRESSURE: 99 MMHG

## 2024-01-18 DIAGNOSIS — R10.84 GENERALIZED ABDOMINAL PAIN: ICD-10-CM

## 2024-01-18 DIAGNOSIS — E11.40 TYPE 2 DIABETES MELLITUS WITH DIABETIC NEUROPATHY, WITHOUT LONG-TERM CURRENT USE OF INSULIN: ICD-10-CM

## 2024-01-18 DIAGNOSIS — K52.9 COLITIS: ICD-10-CM

## 2024-01-18 DIAGNOSIS — K58.9 IRRITABLE BOWEL SYNDROME, UNSPECIFIED TYPE: Primary | ICD-10-CM

## 2024-01-18 LAB
ALBUMIN SERPL-MCNC: 4.1 G/DL (ref 3.5–5.2)
ALBUMIN/GLOB SERPL: 1.8 G/DL
ALP SERPL-CCNC: 96 U/L (ref 39–117)
ALT SERPL W P-5'-P-CCNC: 14 U/L (ref 1–33)
ANION GAP SERPL CALCULATED.3IONS-SCNC: 8.3 MMOL/L (ref 5–15)
AST SERPL-CCNC: 15 U/L (ref 1–32)
BASOPHILS # BLD AUTO: 0.05 10*3/MM3 (ref 0–0.2)
BASOPHILS NFR BLD AUTO: 1 % (ref 0–1.5)
BILIRUB SERPL-MCNC: 0.7 MG/DL (ref 0–1.2)
BUN SERPL-MCNC: 14 MG/DL (ref 6–20)
BUN/CREAT SERPL: 25.9 (ref 7–25)
CALCIUM SPEC-SCNC: 9.2 MG/DL (ref 8.6–10.5)
CHLORIDE SERPL-SCNC: 100 MMOL/L (ref 98–107)
CHOLEST SERPL-MCNC: 124 MG/DL (ref 0–200)
CO2 SERPL-SCNC: 29.7 MMOL/L (ref 22–29)
CREAT SERPL-MCNC: 0.54 MG/DL (ref 0.57–1)
DEPRECATED RDW RBC AUTO: 37.1 FL (ref 37–54)
EGFRCR SERPLBLD CKD-EPI 2021: 110.9 ML/MIN/1.73
EOSINOPHIL # BLD AUTO: 0.09 10*3/MM3 (ref 0–0.4)
EOSINOPHIL NFR BLD AUTO: 1.7 % (ref 0.3–6.2)
ERYTHROCYTE [DISTWIDTH] IN BLOOD BY AUTOMATED COUNT: 12.7 % (ref 12.3–15.4)
GLOBULIN UR ELPH-MCNC: 2.3 GM/DL
GLUCOSE SERPL-MCNC: 162 MG/DL (ref 65–99)
HBA1C MFR BLD: 8.1 % (ref 4.8–5.6)
HCT VFR BLD AUTO: 39.9 % (ref 34–46.6)
HDLC SERPL-MCNC: 39 MG/DL (ref 40–60)
HGB BLD-MCNC: 13.5 G/DL (ref 12–15.9)
IMM GRANULOCYTES # BLD AUTO: 0.02 10*3/MM3 (ref 0–0.05)
IMM GRANULOCYTES NFR BLD AUTO: 0.4 % (ref 0–0.5)
LDLC SERPL CALC-MCNC: 72 MG/DL (ref 0–100)
LDLC/HDLC SERPL: 1.88 {RATIO}
LYMPHOCYTES # BLD AUTO: 2.18 10*3/MM3 (ref 0.7–3.1)
LYMPHOCYTES NFR BLD AUTO: 41.5 % (ref 19.6–45.3)
MCH RBC QN AUTO: 27.6 PG (ref 26.6–33)
MCHC RBC AUTO-ENTMCNC: 33.8 G/DL (ref 31.5–35.7)
MCV RBC AUTO: 81.4 FL (ref 79–97)
MONOCYTES # BLD AUTO: 0.36 10*3/MM3 (ref 0.1–0.9)
MONOCYTES NFR BLD AUTO: 6.9 % (ref 5–12)
NEUTROPHILS NFR BLD AUTO: 2.55 10*3/MM3 (ref 1.7–7)
NEUTROPHILS NFR BLD AUTO: 48.5 % (ref 42.7–76)
NRBC BLD AUTO-RTO: 0 /100 WBC (ref 0–0.2)
PLATELET # BLD AUTO: 237 10*3/MM3 (ref 140–450)
PMV BLD AUTO: 10.6 FL (ref 6–12)
POTASSIUM SERPL-SCNC: 4.5 MMOL/L (ref 3.5–5.2)
PROT SERPL-MCNC: 6.4 G/DL (ref 6–8.5)
RBC # BLD AUTO: 4.9 10*6/MM3 (ref 3.77–5.28)
SODIUM SERPL-SCNC: 138 MMOL/L (ref 136–145)
TRIGL SERPL-MCNC: 58 MG/DL (ref 0–150)
VLDLC SERPL-MCNC: 13 MG/DL (ref 5–40)
WBC NRBC COR # BLD AUTO: 5.25 10*3/MM3 (ref 3.4–10.8)

## 2024-01-18 PROCEDURE — 36415 COLL VENOUS BLD VENIPUNCTURE: CPT | Performed by: FAMILY MEDICINE

## 2024-01-18 PROCEDURE — 83036 HEMOGLOBIN GLYCOSYLATED A1C: CPT | Performed by: FAMILY MEDICINE

## 2024-01-18 PROCEDURE — 80061 LIPID PANEL: CPT | Performed by: FAMILY MEDICINE

## 2024-01-18 PROCEDURE — 99213 OFFICE O/P EST LOW 20 MIN: CPT | Performed by: FAMILY MEDICINE

## 2024-01-18 PROCEDURE — 80053 COMPREHEN METABOLIC PANEL: CPT | Performed by: FAMILY MEDICINE

## 2024-01-18 PROCEDURE — 85025 COMPLETE CBC W/AUTO DIFF WBC: CPT | Performed by: FAMILY MEDICINE

## 2024-01-18 RX ORDER — LEVOFLOXACIN 500 MG/1
500 TABLET, FILM COATED ORAL DAILY
Qty: 3 TABLET | Refills: 0 | Status: SHIPPED | OUTPATIENT
Start: 2024-01-18 | End: 2024-01-21

## 2024-01-18 NOTE — PROGRESS NOTES
Chief Complaint  Abdominal Pain (Follow up from ER for stomach pain)    Subjective      History of Present Illness  Ashli Gutierrez is a 53 y.o. female who presents to [facility] with a past medical history of  Past Medical History:   Diagnosis Date    Arthritis     Bladder disorder     CAD (coronary artery disease)     Chronic low back pain     Diabetes mellitus     GERD (gastroesophageal reflux disease)     Heart attack     Hyperlipidemia     Hypertension     Neurologic disorder     Onychomycosis     Sciatica     Thyroid disorder      ER follow-up for stomach pain.  Patient was seen in [place] ER on 1/7/2024 due to abdominal cramping and diarrhea.  At the time lab work was unremarkable including CBC, CMP, lipase, urinalysis was unremarkable.  She was given Levaquin and Imodium for enteritis and diarrhea and then discharged home. Pain completely went away after a couple days.    Today, she reports having that abdominal ache again but not as bad. No fevers. A couple weeks prior to her ER visit she had a fever and a similar ache in her stomach. She usually takes zofran and dicyclomine which clears it up. But then it got real severe when she went to the ER. Yesterday some of the minor aches across her whole abdomen started again so she's worried the severe pain is on its way. Goes across her whole abdomen and across her back.    Eating, bowel movements, urinating has no change in symptoms. Bowel movements are normal, daily, no constipation. Has nausea at baseline with her Norco. No vomiting, no worse nausea. The dicyclomine does not help with her current pain. Has had her gall bladder removed in about 2000. Still has the appendix. Has vaginal itching and has been to gynecology for this and has a steroid cream for this - no discharge or anything new. No pain with urination.     Objective   Vital Signs:   Vitals:    01/18/24 0914   BP: 155/99   Pulse: 83   Temp: 96.1 °F (35.6 °C)  "  SpO2: 99%   Weight: 94.8 kg (209 lb)   Height: 167.6 cm (66\")     Body mass index is 33.73 kg/m².    Wt Readings from Last 3 Encounters:   01/18/24 94.8 kg (209 lb)   12/27/23 93.4 kg (206 lb)   10/13/23 94.2 kg (207 lb 11.2 oz)     BP Readings from Last 3 Encounters:   01/18/24 155/99   12/27/23 147/95   10/13/23 125/88       Health Maintenance   Topic Date Due    Hepatitis B (1 of 3 - 3-dose series) Never done    Pneumococcal Vaccine 0-64 (1 of 2 - PCV) Never done    TDAP/TD VACCINES (1 - Tdap) Never done    MAMMOGRAM  05/06/2018    ZOSTER VACCINE (1 of 2) Never done    DIABETIC FOOT EXAM  04/19/2023    DIABETIC EYE EXAM  05/18/2023    INFLUENZA VACCINE  08/01/2023    COVID-19 Vaccine (4 - 2023-24 season) 09/01/2023    HEMOGLOBIN A1C  09/29/2023    LIPID PANEL  03/29/2024    URINE MICROALBUMIN  03/29/2024    ANNUAL PHYSICAL  09/15/2024    BMI FOLLOWUP  09/15/2024    PAP SMEAR  05/05/2025    COLORECTAL CANCER SCREENING  10/22/2028    HEPATITIS C SCREENING  Completed       Physical Exam  Vitals and nursing note reviewed.   Constitutional:       General: She is not in acute distress.     Appearance: Normal appearance.   Cardiovascular:      Rate and Rhythm: Normal rate and regular rhythm.   Pulmonary:      Effort: Pulmonary effort is normal. No respiratory distress.      Breath sounds: Normal breath sounds. No wheezing.   Abdominal:      General: Bowel sounds are normal. There is no distension.      Palpations: Abdomen is soft. There is no mass.      Comments: Tenderness centrally in her abdomen, with more minor tenderness in the rest of her abdominal areas.  No tenderness on her back where she is also having some of the referred abdominal pain.   Skin:     General: Skin is warm and dry.   Neurological:      Mental Status: She is alert.   Psychiatric:         Mood and Affect: Mood normal.         Behavior: Behavior normal.            Result Review :  The following data was reviewed by: Preet Villanueva MD on " 01/18/2024:      Procedures        Assessment and Plan   Diagnoses and all orders for this visit:    1. Irritable bowel syndrome, unspecified type (Primary)  -     Ambulatory Referral to Gastroenterology    2. Generalized abdominal pain  -     Ambulatory Referral to Gastroenterology    3. Colitis  -     levoFLOXacin (Levaquin) 500 MG tablet; Take 1 tablet by mouth Daily for 3 days.  Dispense: 3 tablet; Refill: 0    4. Type 2 diabetes mellitus with diabetic neuropathy, without long-term current use of insulin  -     CBC & Differential  -     Comprehensive Metabolic Panel  -     Lipid Panel  -     Hemoglobin A1c      Will treat for colitis with Levaquin given the fact that she has a penicillin allergy with unknown response to cephalosporins.  We discussed the side effects of Levaquin.  She does have a Cipro allergy listed in the chart but she took Levaquin a few weeks ago and it both helped her colitis and she tolerated it just fine.  She follows a GI doctor but she wants to switch to someone else so I placed a referral.  We discussed red flag symptoms for appendicitis including fever and chills, worsening pain, periumbilical tenderness, nausea and vomiting.  She is having pretty minor symptoms right now but I advised if things get worse to go to the ED.            FOLLOW UP  Return if symptoms worsen or fail to improve.  Patient was given instructions and counseling regarding her condition or for health maintenance advice. Please see specific information pulled into the AVS if appropriate.       Preet Villanueva MD  01/18/24  10:18 EST    CURRENT & DISCONTINUED MEDICATIONS  Current Outpatient Medications   Medication Instructions    acetaminophen (TYLENOL) 650 mg, Oral, Every 6 Hours PRN    Allergy Relief Cetirizine 10 mg, Oral, Daily    aspirin 81 mg, Oral, Daily    atorvastatin (LIPITOR) 80 mg, Oral, Daily    clopidogrel (PLAVIX) 75 mg, Oral, Daily    dicyclomine (BENTYL) 10 mg, Oral, Every 6 Hours PRN     fluticasone (CUTIVATE) 0.005 % ointment 1 application , Topical, 2 Times Daily    gabapentin (NEURONTIN) 400 MG capsule No dose, route, or frequency recorded.    GaviLyte-G 236 g solution TAKE 4000 ML BY MOUTH AS DIRECTED BY THE OFFICE FOR COLON PREP    HYDROcodone-acetaminophen (NORCO) 5-325 MG per tablet TAKE ONE TABLET BY MOUTH FOUR TIMES DAILY AS NEEDED FOR PAIN. FILL DATE 10/22/2021    isosorbide mononitrate (IMDUR) 60 mg, Oral, Daily    Lancets misc 1 each, Does not apply, 2 Times Daily    levoFLOXacin (LEVAQUIN) 500 mg, Oral, Daily    losartan (COZAAR) 100 mg, Oral, Daily    metFORMIN (GLUCOPHAGE) 1,000 mg, Oral, 2 Times Daily With Meals    metoprolol tartrate (LOPRESSOR) 25 mg, Oral, 2 Times Daily    montelukast (SINGULAIR) 10 mg, Oral, Daily    nitroglycerin (NITROSTAT) 0.4 mg, Oral, As Needed    ondansetron (ZOFRAN) 4 mg, Oral, Every 8 Hours PRN    pantoprazole (PROTONIX) 20 mg, Oral, Daily    tiZANidine (ZANAFLEX) 4 mg, Oral, Every 8 Hours PRN       There are no discontinued medications.

## 2024-01-24 ENCOUNTER — OFFICE VISIT (OUTPATIENT)
Dept: FAMILY MEDICINE CLINIC | Facility: CLINIC | Age: 54
End: 2024-01-24
Payer: COMMERCIAL

## 2024-01-24 VITALS
HEIGHT: 66 IN | OXYGEN SATURATION: 95 % | SYSTOLIC BLOOD PRESSURE: 124 MMHG | DIASTOLIC BLOOD PRESSURE: 74 MMHG | WEIGHT: 207 LBS | BODY MASS INDEX: 33.27 KG/M2 | HEART RATE: 88 BPM | TEMPERATURE: 97.7 F

## 2024-01-24 DIAGNOSIS — I95.9 HYPOTENSION, UNSPECIFIED HYPOTENSION TYPE: ICD-10-CM

## 2024-01-24 DIAGNOSIS — L65.9 HAIR LOSS: Primary | ICD-10-CM

## 2024-01-24 DIAGNOSIS — E11.9 NON-INSULIN DEPENDENT TYPE 2 DIABETES MELLITUS: ICD-10-CM

## 2024-01-24 PROCEDURE — 84443 ASSAY THYROID STIM HORMONE: CPT | Performed by: FAMILY MEDICINE

## 2024-01-24 RX ORDER — ALOGLIPTIN 12.5 MG/1
12.5 TABLET, FILM COATED ORAL DAILY
Qty: 30 TABLET | Refills: 0 | Status: SHIPPED | OUTPATIENT
Start: 2024-01-24

## 2024-01-24 RX ORDER — ADHESIVE BANDAGE 3/4"
BANDAGE TOPICAL
Qty: 1 EACH | Refills: 0 | Status: SHIPPED | OUTPATIENT
Start: 2024-01-24 | End: 2024-01-25 | Stop reason: SDUPTHER

## 2024-01-24 NOTE — PROGRESS NOTES
Venipuncture Blood Specimen Collection  Venipuncture performed in left arm by Rubina Bingham with good hemostasis. Patient tolerated the procedure well without complications.   01/24/24   Rubina Bingham

## 2024-01-24 NOTE — PROGRESS NOTES
"Chief Complaint    Hospital Follow Up Visit (Was seen at [facility] yesterday after vasovagal episode at home.  States she is doing better today but still feels \"off\" and has a headache.)    Ling Gutierrez presents to [facility]    History of Present Illness    1.) ER DISCHARGE FOLLOW UP/HYPOTENSION/SYNCOPE : Patient presents after recent visit to the ER.  Presented after vasovagal episode at home-weakness.  In the ER, no alarming findings on labs and imaging.  Significant hypotension noted in ER at presentation.  She has discontinued losartan but continues to take metoprolol.  Her blood pressure appears controlled during this visit.  No episodes of syncope since her discharge from the ER.    2.) NIDDM : Most recent hemoglobin A1c measured at 7.80%.  Increased from 7.50%, when last checked.  Patient admitted to her last visit that she had discontinued Farxiga secondary to significant cost.  She continues to take metformin as prescribed.    Objective     Vital Signs:     /74 (BP Location: Left arm, Patient Position: Sitting, Cuff Size: Adult)   Pulse 88   Temp 97.7 °F (36.5 °C) (Temporal)   Ht 167.6 cm (66\")   Wt 93.9 kg (207 lb)   SpO2 95%   BMI 33.41 kg/m²       Physical Exam  Vitals reviewed.   Constitutional:       General: She is not in acute distress.     Appearance: Normal appearance. She is well-developed.   HENT:      Head: Normocephalic and atraumatic.      Right Ear: Hearing and external ear normal.      Left Ear: Hearing and external ear normal.      Nose: Nose normal.   Eyes:      General: Lids are normal.         Right eye: No discharge.         Left eye: No discharge.      Conjunctiva/sclera: Conjunctivae normal.   Pulmonary:      Effort: Pulmonary effort is normal.   Abdominal:      General: There is no distension.   Musculoskeletal:         General: No swelling.      Cervical back: Neck supple.   Skin:     " Coloration: Skin is not jaundiced.      Findings: No erythema.   Neurological:      Mental Status: She is alert. Mental status is at baseline.   Psychiatric:         Mood and Affect: Mood and affect normal.         Thought Content: Thought content normal.     Assessment and Plan     Diagnoses and all orders for this visit:    1. Hair loss (Primary)  Comments:  Complaining of hair loss during visit.  Discussed checking TSH as noted.  Orders:  -     TSH    2. Hypotension, unspecified hypotension type  Comments:  BP normotensive during this visit.  We will continue to hold losartan and patient will monitor her BP x 2 weeks. F/u at that time w/ recommendations.    3. Non-insulin dependent type 2 diabetes mellitus  Comments:  DPP 4 added to diabetic medications.  Continue metformin as prescribed.  Patient will call with any issues with new medication.    Other orders  -     Alogliptin Benzoate 12.5 MG tablet; Take 1 tablet by mouth Daily.  Dispense: 30 tablet; Refill: 0  -     Discontinue: Blood Pressure Monitoring (Blood Pressure Cuff) misc; USE ONCE DAILY  Dispense: 1 each; Refill: 0    Follow Up     Return in about 2 weeks (around 2/7/2024).    Patient was given instructions and counseling regarding her condition or for health maintenance advice. Please see specific information pulled into the AVS if appropriate.

## 2024-01-25 LAB — TSH SERPL DL<=0.05 MIU/L-ACNC: 0.71 UIU/ML (ref 0.27–4.2)

## 2024-01-25 RX ORDER — ADHESIVE BANDAGE 3/4"
BANDAGE TOPICAL
Qty: 1 EACH | Refills: 0 | Status: SHIPPED | OUTPATIENT
Start: 2024-01-25

## 2024-01-25 NOTE — TELEPHONE ENCOUNTER
Caller: Ashli Gutierrez    Relationship: Self    Best call back number: [phone]     Requested Prescriptions:   Requested Prescriptions     Pending Prescriptions Disp Refills    Blood Pressure Monitoring (Blood Pressure Cuff) misc 1 each 0     Sig: USE ONCE DAILY        Pharm[facility] where request should be sent[id number] - [place], [place] - [address] - [phone] [facility] [phone] FX     Last office visit with prescribing clinician: 1/24/2024   Last telemedicine visit with prescribing clinician: Visit date not found   Next office visit with prescribing clinician: Visit date not found     Additional details provided by patient: PATIENT STATES THE FIRST PHARMACY WE SENT THIS TO DOESN'T STOCK THEM.      William Marshall Rep   01/25/24 11:05 EST

## 2024-02-05 ENCOUNTER — TRANSCRIBE ORDERS (OUTPATIENT)
Dept: ADMINISTRATIVE | Facility: HOSPITAL | Age: 54
End: 2024-02-05
Payer: COMMERCIAL

## 2024-02-05 ENCOUNTER — OFFICE (OUTPATIENT)
Dept: URBAN - METROPOLITAN AREA CLINIC 76 | Facility: CLINIC | Age: 54
End: 2024-02-05

## 2024-02-05 VITALS
DIASTOLIC BLOOD PRESSURE: 83 MMHG | HEIGHT: 66 IN | HEART RATE: 76 BPM | SYSTOLIC BLOOD PRESSURE: 118 MMHG | WEIGHT: 207 LBS | OXYGEN SATURATION: 96 %

## 2024-02-05 DIAGNOSIS — Z12.11 SPECIAL SCREENING FOR MALIGNANT NEOPLASMS, COLON: ICD-10-CM

## 2024-02-05 DIAGNOSIS — K59.00 CONSTIPATION, UNSPECIFIED: ICD-10-CM

## 2024-02-05 DIAGNOSIS — R10.84 ABDOMINAL PAIN, GENERALIZED: ICD-10-CM

## 2024-02-05 DIAGNOSIS — K59.00 CONSTIPATION, UNSPECIFIED CONSTIPATION TYPE: Primary | ICD-10-CM

## 2024-02-05 DIAGNOSIS — K52.9 NONINFECTIOUS ILEITIS: ICD-10-CM

## 2024-02-05 DIAGNOSIS — R10.84 GENERALIZED ABDOMINAL PAIN: ICD-10-CM

## 2024-02-05 DIAGNOSIS — Z12.11 ENCOUNTER FOR SCREENING FOR MALIGNANT NEOPLASM OF COLON: ICD-10-CM

## 2024-02-05 DIAGNOSIS — K52.9 NONINFECTIVE GASTROENTERITIS AND COLITIS, UNSPECIFIED: ICD-10-CM

## 2024-02-05 PROCEDURE — 99204 OFFICE O/P NEW MOD 45 MIN: CPT | Performed by: INTERNAL MEDICINE

## 2024-02-05 RX ORDER — PREDNISONE 50 MG/1
TABLET ORAL
Qty: 3 | Refills: 0 | Status: ACTIVE
Start: 2024-02-05

## 2024-02-06 ENCOUNTER — TELEPHONE (OUTPATIENT)
Dept: FAMILY MEDICINE CLINIC | Facility: CLINIC | Age: 54
End: 2024-02-06
Payer: COMMERCIAL

## 2024-02-06 ENCOUNTER — TRANSCRIBE ORDERS (OUTPATIENT)
Dept: ADMINISTRATIVE | Facility: HOSPITAL | Age: 54
End: 2024-02-06
Payer: COMMERCIAL

## 2024-02-06 DIAGNOSIS — K52.9 INFLAMMATORY BOWEL DISEASE: Primary | ICD-10-CM

## 2024-02-06 DIAGNOSIS — R10.84 ABDOMINAL PAIN, GENERALIZED: ICD-10-CM

## 2024-02-06 DIAGNOSIS — Z12.11 SPECIAL SCREENING FOR MALIGNANT NEOPLASMS, COLON: ICD-10-CM

## 2024-02-06 DIAGNOSIS — K59.00 CONSTIPATION, UNSPECIFIED CONSTIPATION TYPE: ICD-10-CM

## 2024-02-06 RX ORDER — LOSARTAN POTASSIUM 100 MG/1
50 TABLET ORAL DAILY
Qty: 90 TABLET | Refills: 1 | Status: SHIPPED | OUTPATIENT
Start: 2024-02-06 | End: 2024-02-09

## 2024-02-06 NOTE — TELEPHONE ENCOUNTER
Patient was told to stop losartan due to hypotensive episodes. She said that she was monitoring her blood pressure to make sure it did not elevate again after stopping. She said over the last week she was getting consistently 140s/150s systolic and 90s / 100s diastolic. She said that on  Thursday she began taking her losartan in a HALF dose of 50mg daily. She said that now she is monitoring it and it is in the 110-120 range over 70-80 range. She said that for the time being she will take 50mg daily and monitor, if she begins to drop again she will call. She also was advised in office follow up was recommended around 2/7/24 but had not been made, so she is now making a follow up for 1-2 weeks from now. She said to let her know if anything needs to change.

## 2024-02-09 ENCOUNTER — OFFICE VISIT (OUTPATIENT)
Dept: FAMILY MEDICINE CLINIC | Facility: CLINIC | Age: 54
End: 2024-02-09
Payer: COMMERCIAL

## 2024-02-09 VITALS
WEIGHT: 209 LBS | SYSTOLIC BLOOD PRESSURE: 124 MMHG | DIASTOLIC BLOOD PRESSURE: 86 MMHG | OXYGEN SATURATION: 97 % | HEART RATE: 106 BPM | BODY MASS INDEX: 33.73 KG/M2

## 2024-02-09 DIAGNOSIS — I10 UNCONTROLLED HYPERTENSION: Primary | ICD-10-CM

## 2024-02-09 DIAGNOSIS — R21 RASH: ICD-10-CM

## 2024-02-09 PROCEDURE — 99213 OFFICE O/P EST LOW 20 MIN: CPT | Performed by: FAMILY MEDICINE

## 2024-02-09 RX ORDER — CLOTRIMAZOLE AND BETAMETHASONE DIPROPIONATE 10; .64 MG/G; MG/G
1 CREAM TOPICAL 2 TIMES DAILY
Qty: 10 G | Refills: 0 | Status: SHIPPED | OUTPATIENT
Start: 2024-02-09 | End: 2024-02-14

## 2024-02-09 RX ORDER — LOSARTAN POTASSIUM 25 MG/1
25 TABLET ORAL DAILY
Qty: 90 TABLET | Refills: 1 | Status: SHIPPED | OUTPATIENT
Start: 2024-02-09 | End: 2024-02-12 | Stop reason: SDUPTHER

## 2024-02-09 NOTE — PROGRESS NOTES
Chief Complaint    Hypertension    Subjective      Ashli Gutierrez presents to [facility]    History of Present Illness    1.) HTN : Recently uncontrolled.  Patient initially seen in office for hypotension.  Losartan was discontinued.  She reports noticing up trending of her BP after stopping the medication.  Originally on a dose of 100 mg.  Patient notes utilizing half that dose and was able to obtain some control of her blood pressure.  Reports measurements at home as high as 155/105.    2.) RASH : Location-posterior aspect of left lower extremities.  Erythematous and pruritic.  Onset-several days ago.    Objective     Vital Signs:     /86   Pulse 106   Wt 94.8 kg (209 lb)   SpO2 97%   BMI 33.73 kg/m²       Physical Exam  Vitals reviewed.   Constitutional:       General: She is not in acute distress.     Appearance: Normal appearance. She is well-developed.   HENT:      Head: Normocephalic and atraumatic.      Right Ear: Hearing and external ear normal.      Left Ear: Hearing and external ear normal.      Nose: Nose normal.   Eyes:      General: Lids are normal.         Right eye: No discharge.         Left eye: No discharge.      Conjunctiva/sclera: Conjunctivae normal.   Pulmonary:      Effort: Pulmonary effort is normal.   Abdominal:      General: There is no distension.   Musculoskeletal:         General: No swelling.      Cervical back: Neck supple.   Skin:     Coloration: Skin is not jaundiced.      Findings: No erythema.   Neurological:      Mental Status: She is alert. Mental status is at baseline.   Psychiatric:         Mood and Affect: Mood and affect normal.         Thought Content: Thought content normal.     Assessment and Plan     Diagnoses and all orders for this visit:    1. Uncontrolled hypertension (Primary)  Comments:  Restart losartan 25 mg.  Continue to log blood pressure at home.  Continue metoprolol.  Return in 2 weeks for reevaluation.    2.  Rash  Comments:  Suspect candidiasis - recommend topical as noted.  If rash recurs, advised of likely nummular eczema.    Other orders  -     losartan (Cozaar) 25 MG tablet; Take 1 tablet by mouth Daily.  Dispense: 90 tablet; Refill: 1  -     clotrimazole-betamethasone (LOTRISONE) 1-0.05 % cream; Apply 1 Application topically to the appropriate area as directed 2 (Two) Times a Day for 5 days.  Dispense: 10 g; Refill: 0    Follow Up     Return in about 2 weeks (around 2/23/2024).    Patient was given instructions and counseling regarding her condition or for health maintenance advice. Please see specific information pulled into the AVS if appropriate.

## 2024-02-12 ENCOUNTER — TELEPHONE (OUTPATIENT)
Dept: FAMILY MEDICINE CLINIC | Facility: CLINIC | Age: 54
End: 2024-02-12
Payer: COMMERCIAL

## 2024-02-12 RX ORDER — LOSARTAN POTASSIUM 25 MG/1
50 TABLET ORAL DAILY
Start: 2024-02-12

## 2024-02-14 ENCOUNTER — TELEPHONE (OUTPATIENT)
Dept: FAMILY MEDICINE CLINIC | Facility: CLINIC | Age: 54
End: 2024-02-14
Payer: COMMERCIAL

## 2024-02-26 ENCOUNTER — OFFICE VISIT (OUTPATIENT)
Dept: FAMILY MEDICINE CLINIC | Facility: CLINIC | Age: 54
End: 2024-02-26
Payer: COMMERCIAL

## 2024-02-26 VITALS
SYSTOLIC BLOOD PRESSURE: 144 MMHG | DIASTOLIC BLOOD PRESSURE: 80 MMHG | WEIGHT: 207 LBS | TEMPERATURE: 97.8 F | OXYGEN SATURATION: 95 % | HEART RATE: 91 BPM | HEIGHT: 66 IN | BODY MASS INDEX: 33.27 KG/M2

## 2024-02-26 DIAGNOSIS — I10 UNCONTROLLED HYPERTENSION: Primary | ICD-10-CM

## 2024-02-26 PROCEDURE — 99213 OFFICE O/P EST LOW 20 MIN: CPT | Performed by: FAMILY MEDICINE

## 2024-02-26 RX ORDER — LOSARTAN POTASSIUM 25 MG/1
75 TABLET ORAL DAILY
Start: 2024-02-26

## 2024-02-26 NOTE — PROGRESS NOTES
"Chief Complaint    Hypertension (Follow-up)    Subjective      Ashli Gutierrez presents to [facility]    History of Present Illness    1.) UNCONTROLLED HTN : Pt presents for follow up regarding recent onset of uncontrolled HTN. During most recent visit here in office, dose of Losartan increased to 75 mg. Patient remained on her current dose of metoprolol. She presents with her blood pressure as noted and reports higher systolic and diastolic values at home. Patient does note intermittent episodes of dizziness, which appears to be mostly associated with moving from being flexed at the waist to extension.     Objective     Vital Signs:     /80 (BP Location: Left arm, Patient Position: Sitting, Cuff Size: Adult)   Pulse 91   Temp 97.8 °F (36.6 °C) (Temporal)   Ht 167.6 cm (66\")   Wt 93.9 kg (207 lb)   SpO2 95%   BMI 33.41 kg/m²       Physical Exam  Vitals reviewed.   Constitutional:       General: She is not in acute distress.     Appearance: Normal appearance. She is well-developed.   HENT:      Head: Normocephalic and atraumatic.      Right Ear: Hearing and external ear normal.      Left Ear: Hearing and external ear normal.      Nose: Nose normal.   Eyes:      General: Lids are normal.         Right eye: No discharge.         Left eye: No discharge.      Conjunctiva/sclera: Conjunctivae normal.   Pulmonary:      Effort: Pulmonary effort is normal.   Abdominal:      General: There is no distension.   Musculoskeletal:         General: No swelling.      Cervical back: Neck supple.   Skin:     Coloration: Skin is not jaundiced.      Findings: No erythema.   Neurological:      Mental Status: She is alert. Mental status is at baseline.   Psychiatric:         Mood and Affect: Mood and affect normal.         Thought Content: Thought content normal.     Assessment and Plan     Diagnoses and all orders for this visit:    1. Uncontrolled hypertension (Primary)    Other orders  -    "  losartan (Cozaar) 25 MG tablet; Take 3 tablets by mouth Daily.    Shared decision to remain at current dosing of ARB. Patient will start taking in the AM. Continue BID beta blocker. Continue home monitoring. Call with an interim alarms. Drop off log in 2 weeks for review or schedule a visit, if needed.     Follow Up : 2 weeks.     Patient was given instructions and counseling regarding her condition or for health maintenance advice. Please see specific information pulled into the AVS if appropriate.

## 2024-03-07 ENCOUNTER — TELEPHONE (OUTPATIENT)
Dept: GASTROENTEROLOGY | Facility: CLINIC | Age: 54
End: 2024-03-07
Payer: COMMERCIAL

## 2024-03-07 NOTE — TELEPHONE ENCOUNTER
Looks like patient has been scheduled for EGD/colonoscopy on 2 different occasions with Dr. Mcguire's office.  She canceled the one in 2021 and no showed the most recent one 11/2023.  I also see a scanned in clearance request letter from [facility] (Dr. Morin) where they wanted to schedule her for an EGD/Colonoscopy.

## 2024-03-07 NOTE — TELEPHONE ENCOUNTER
Ashli Gutierrez, 1970, has requested to transfer care from Chintan Mcguire MD to Qing Emmanuel MD.    Reason for transfer: WOULD PREFER TO SEE A FEMALE DOCTOR     Please review the patients records for possible transfer of care. The patient is aware that it is at the receiving provider's discretion to approve or deny this transfer request.

## 2024-03-07 NOTE — TELEPHONE ENCOUNTER
"Caller: Ashli Gutierrez    Relationship: Self    Best call back number: [phone]    Who is your current provider: SCOTTIE DANIEL    Is your current provider offboarding? NO    Who would you like your new provider to be: CARLOS VARNER    What are your reasons for transferring care: \"WOULD PREFER TO SEE A FEMALE DOCTOR\"    Additional notes:          "

## 2024-03-14 ENCOUNTER — TELEPHONE (OUTPATIENT)
Dept: GASTROENTEROLOGY | Facility: CLINIC | Age: 54
End: 2024-03-14
Payer: COMMERCIAL

## 2024-03-14 NOTE — TELEPHONE ENCOUNTER
Hub staff attempted to follow warm transfer process and was unsuccessful     Caller: Ashli Gutierrez    Relationship to patient: Self    Best call back number: [phone]    Patient is needing: PATIENT CALLED IN AND STATED THAT SHE WAS RETURNING A MISSED CALL. PLEASE CALL BACK ANYTIME, DO NOT LEAVE VM, PATIENT HAS NO ACCESS.

## 2024-03-15 RX ORDER — ALOGLIPTIN 12.5 MG/1
1 TABLET, FILM COATED ORAL DAILY
Qty: 90 TABLET | Refills: 0 | Status: SHIPPED | OUTPATIENT
Start: 2024-03-15

## 2024-03-15 NOTE — TELEPHONE ENCOUNTER
I attempted to contact pt, no answer, VM box full. Transfer of care has been denied by Dr. Emmanuel's office. Will need to notify pt. See other telephone encounter.

## 2024-03-25 ENCOUNTER — TELEPHONE (OUTPATIENT)
Dept: FAMILY MEDICINE CLINIC | Facility: CLINIC | Age: 54
End: 2024-03-25
Payer: COMMERCIAL

## 2024-03-25 RX ORDER — LOSARTAN POTASSIUM 25 MG/1
75 TABLET ORAL DAILY
Qty: 90 TABLET | Refills: 5
Start: 2024-03-25 | End: 2024-04-24

## 2024-03-25 RX ORDER — ASPIRIN 81 MG/1
81 TABLET ORAL DAILY
Qty: 90 TABLET | Refills: 1 | Status: SHIPPED | OUTPATIENT
Start: 2024-03-25

## 2024-03-25 RX ORDER — ISOSORBIDE MONONITRATE 60 MG/1
60 TABLET, EXTENDED RELEASE ORAL DAILY
Qty: 90 TABLET | Refills: 1 | Status: SHIPPED | OUTPATIENT
Start: 2024-03-25

## 2024-03-25 RX ORDER — PANTOPRAZOLE SODIUM 20 MG/1
20 TABLET, DELAYED RELEASE ORAL DAILY
Qty: 90 TABLET | Refills: 3 | Status: SHIPPED | OUTPATIENT
Start: 2024-03-25

## 2024-03-25 RX ORDER — NITROGLYCERIN 0.4 MG/1
0.4 TABLET SUBLINGUAL AS NEEDED
Qty: 15 TABLET | Refills: 3 | Status: SHIPPED | OUTPATIENT
Start: 2024-03-25

## 2024-03-25 RX ORDER — ATORVASTATIN CALCIUM 80 MG/1
80 TABLET, FILM COATED ORAL DAILY
Qty: 90 TABLET | Refills: 1 | Status: SHIPPED | OUTPATIENT
Start: 2024-03-25

## 2024-03-25 RX ORDER — CLOPIDOGREL BISULFATE 75 MG/1
75 TABLET ORAL DAILY
Qty: 90 TABLET | Refills: 1 | Status: SHIPPED | OUTPATIENT
Start: 2024-03-25

## 2024-03-25 RX ORDER — MONTELUKAST SODIUM 10 MG/1
10 TABLET ORAL DAILY
Qty: 90 TABLET | Refills: 1 | Status: SHIPPED | OUTPATIENT
Start: 2024-03-25

## 2024-03-25 RX ORDER — CETIRIZINE HYDROCHLORIDE 10 MG/1
10 TABLET ORAL DAILY
Qty: 90 TABLET | Refills: 3 | Status: SHIPPED | OUTPATIENT
Start: 2024-03-25

## 2024-03-25 RX ORDER — ONDANSETRON 4 MG/1
4 TABLET, FILM COATED ORAL EVERY 8 HOURS PRN
Qty: 15 TABLET | Refills: 2 | Status: SHIPPED | OUTPATIENT
Start: 2024-03-25

## 2024-03-25 NOTE — TELEPHONE ENCOUNTER
Caller: Ashli Gutierrez    Relationship: Self    Best call back number:     [phone]        Requested Prescriptions:   Requested Prescriptions     Pending Prescriptions Disp Refills    pantoprazole (PROTONIX) 40 MG EC tablet       Sig: Take 1 tablet by mouth Daily.    clopidogrel (PLAVIX) 75 MG tablet 90 tablet 1     Sig: Take 1 tablet by mouth Daily.    losartan (Cozaar) 25 MG tablet       Sig: Take 3 tablets by mouth Daily.    ondansetron (ZOFRAN) 4 MG tablet 15 tablet 2     Sig: Take 1 tablet by mouth Every 8 (Eight) Hours As Needed for Nausea or Vomiting.    nitroglycerin (Nitrostat) 0.4 MG SL tablet 15 tablet 3     Sig: Take 1 tablet by mouth As Needed for Chest Pain.    montelukast (SINGULAIR) 10 MG tablet 90 tablet 1     Sig: Take 1 tablet by mouth Daily.    metFORMIN (GLUCOPHAGE) 1000 MG tablet 180 tablet 1     Sig: Take 1 tablet by mouth 2 (Two) Times a Day With Meals.    metoprolol tartrate (LOPRESSOR) 25 MG tablet 120 tablet 2     Sig: Take 1 tablet by mouth 2 (Two) Times a Day.    isosorbide mononitrate (IMDUR) 60 MG 24 hr tablet 90 tablet 1     Sig: Take 1 tablet by mouth Daily.    atorvastatin (LIPITOR) 80 MG tablet 90 tablet 1     Sig: Take 1 tablet by mouth Daily.    aspirin 81 MG EC tablet 90 tablet 1     Sig: Take 1 tablet by mouth Daily.    cetirizine (Allergy Relief Cetirizine) 10 MG tablet 90 tablet 0     Sig: Take 1 tablet by mouth Daily.        Pharmacy where request should be sent: [facility] [place] - [address] - [phone] [facility] [phone] FX     Last office visit with prescribing clinician: 2/26/2024   Last telemedicine visit with prescribing clinician: Visit date not found   Next office visit with prescribing clinician: Visit date not found     Additional details provided by patient: PATIENT STATES SHE IS NEEDING MEDICATION ORDERED AS SOON AS POSSIBLE AS SHE HAS BEEN OUT OF SOME MEDICATIONS. [facility]  ADVISED HAS SENT MULTIPLE REQUESTS.     Does the patient have less than a 3 day supply:  [x] Yes  [] No    Would you like a call back once the refill request has been completed: [] Yes [x] No    If the office needs to give you a call back, can they leave a voicemail: [] Yes [x] No    William Marin   03/25/24 13:03 EDT         DELETE AFTER READING TO PATIENT: “Thank you for sharing this information with me. I will send a message to the clinical team. Please allow 48 hours for the clinical staff to follow up on this request.”

## 2024-04-01 RX ORDER — LOSARTAN POTASSIUM 100 MG/1
100 TABLET ORAL DAILY
Qty: 90 TABLET | Refills: 1 | OUTPATIENT
Start: 2024-04-01

## 2024-05-09 ENCOUNTER — TELEPHONE (OUTPATIENT)
Dept: FAMILY MEDICINE CLINIC | Facility: CLINIC | Age: 54
End: 2024-05-09

## 2024-05-09 NOTE — TELEPHONE ENCOUNTER
Caller: Ashli Gutierrez    Relationship: Self    Best call back number: [phone]    Requested Prescriptions:     losartan (Cozaar)           Pharmacy where request should be sent: [facility] [place] [place] [facility] [address] - [phone] PH - [phone] FX     Last office visit with prescribing clinician: 2/26/2024   Last telemedicine visit with prescribing clinician: Visit date not found   Next office visit with prescribing clinician: Visit date not found     Additional details provided by patient: PATIENT SAID SHE IS CURRENTLY TAKING 100 MG OF LOSARTAN TO CONTROL BLOOD PRESSURE    Does the patient have less than a 3 day supply:  [x] Yes  [] No    Would you like a call back once the refill request has been completed: [] Yes [] No    If the office needs to give you a call back, can they leave a voicemail: [] Yes [x] No    William Mackay Rep   05/09/24 13:15 EDT

## 2024-05-10 RX ORDER — LOSARTAN POTASSIUM 100 MG/1
100 TABLET ORAL DAILY
Qty: 90 TABLET | Refills: 1 | Status: SHIPPED | OUTPATIENT
Start: 2024-05-10 | End: 2024-08-08

## 2024-06-19 ENCOUNTER — OFFICE VISIT (OUTPATIENT)
Dept: FAMILY MEDICINE CLINIC | Facility: CLINIC | Age: 54
End: 2024-06-19

## 2024-06-19 VITALS
WEIGHT: 203 LBS | HEIGHT: 66 IN | DIASTOLIC BLOOD PRESSURE: 74 MMHG | SYSTOLIC BLOOD PRESSURE: 128 MMHG | TEMPERATURE: 97.2 F | OXYGEN SATURATION: 95 % | HEART RATE: 79 BPM | BODY MASS INDEX: 32.62 KG/M2

## 2024-06-19 DIAGNOSIS — E11.40 TYPE 2 DIABETES MELLITUS WITH DIABETIC NEUROPATHY, WITHOUT LONG-TERM CURRENT USE OF INSULIN: Primary | ICD-10-CM

## 2024-06-19 DIAGNOSIS — E83.42 HYPOMAGNESEMIA: ICD-10-CM

## 2024-06-19 DIAGNOSIS — Z12.31 ENCOUNTER FOR SCREENING MAMMOGRAM FOR MALIGNANT NEOPLASM OF BREAST: ICD-10-CM

## 2024-06-19 PROBLEM — I21.9 MYOCARDIAL INFARCTION: Status: ACTIVE | Noted: 2024-04-16

## 2024-06-19 PROBLEM — E16.2 HYPOGLYCEMIA: Status: ACTIVE | Noted: 2024-04-16

## 2024-06-19 LAB
ALBUMIN SERPL-MCNC: 4.7 G/DL (ref 3.5–5.2)
ALBUMIN UR-MCNC: 1.8 MG/DL
ALBUMIN/GLOB SERPL: 1.9 G/DL
ALP SERPL-CCNC: 67 U/L (ref 39–117)
ALT SERPL W P-5'-P-CCNC: 22 U/L (ref 1–33)
ANION GAP SERPL CALCULATED.3IONS-SCNC: 9 MMOL/L (ref 5–15)
AST SERPL-CCNC: 15 U/L (ref 1–32)
BASOPHILS # BLD AUTO: 0.07 10*3/MM3 (ref 0–0.2)
BASOPHILS NFR BLD AUTO: 0.8 % (ref 0–1.5)
BILIRUB SERPL-MCNC: 0.9 MG/DL (ref 0–1.2)
BUN SERPL-MCNC: 16 MG/DL (ref 6–20)
BUN/CREAT SERPL: 26.7 (ref 7–25)
CALCIUM SPEC-SCNC: 9 MG/DL (ref 8.6–10.5)
CHLORIDE SERPL-SCNC: 101 MMOL/L (ref 98–107)
CHOLEST SERPL-MCNC: 130 MG/DL (ref 0–200)
CO2 SERPL-SCNC: 30 MMOL/L (ref 22–29)
CREAT SERPL-MCNC: 0.6 MG/DL (ref 0.57–1)
CREAT UR-MCNC: 115.1 MG/DL
DEPRECATED RDW RBC AUTO: 39.7 FL (ref 37–54)
EGFRCR SERPLBLD CKD-EPI 2021: 107.5 ML/MIN/1.73
EOSINOPHIL # BLD AUTO: 0.08 10*3/MM3 (ref 0–0.4)
EOSINOPHIL NFR BLD AUTO: 1 % (ref 0.3–6.2)
ERYTHROCYTE [DISTWIDTH] IN BLOOD BY AUTOMATED COUNT: 13.4 % (ref 12.3–15.4)
GLOBULIN UR ELPH-MCNC: 2.5 GM/DL
GLUCOSE SERPL-MCNC: 144 MG/DL (ref 65–99)
HBA1C MFR BLD: 9 % (ref 4.8–5.6)
HCT VFR BLD AUTO: 41.2 % (ref 34–46.6)
HDLC SERPL-MCNC: 40 MG/DL (ref 40–60)
HGB BLD-MCNC: 13.5 G/DL (ref 12–15.9)
IMM GRANULOCYTES # BLD AUTO: 0.03 10*3/MM3 (ref 0–0.05)
IMM GRANULOCYTES NFR BLD AUTO: 0.4 % (ref 0–0.5)
LDLC SERPL CALC-MCNC: 77 MG/DL (ref 0–100)
LDLC/HDLC SERPL: 1.95 {RATIO}
LYMPHOCYTES # BLD AUTO: 3.53 10*3/MM3 (ref 0.7–3.1)
LYMPHOCYTES NFR BLD AUTO: 42.7 % (ref 19.6–45.3)
MAGNESIUM SERPL-MCNC: 2.1 MG/DL (ref 1.6–2.6)
MCH RBC QN AUTO: 27.1 PG (ref 26.6–33)
MCHC RBC AUTO-ENTMCNC: 32.8 G/DL (ref 31.5–35.7)
MCV RBC AUTO: 82.6 FL (ref 79–97)
MICROALBUMIN/CREAT UR: 15.6 MG/G (ref 0–29)
MONOCYTES # BLD AUTO: 0.51 10*3/MM3 (ref 0.1–0.9)
MONOCYTES NFR BLD AUTO: 6.2 % (ref 5–12)
NEUTROPHILS NFR BLD AUTO: 4.04 10*3/MM3 (ref 1.7–7)
NEUTROPHILS NFR BLD AUTO: 48.9 % (ref 42.7–76)
NRBC BLD AUTO-RTO: 0 /100 WBC (ref 0–0.2)
PLATELET # BLD AUTO: 268 10*3/MM3 (ref 140–450)
PMV BLD AUTO: 10.9 FL (ref 6–12)
POTASSIUM SERPL-SCNC: 3.8 MMOL/L (ref 3.5–5.2)
PROT SERPL-MCNC: 7.2 G/DL (ref 6–8.5)
RBC # BLD AUTO: 4.99 10*6/MM3 (ref 3.77–5.28)
SODIUM SERPL-SCNC: 140 MMOL/L (ref 136–145)
TRIGL SERPL-MCNC: 60 MG/DL (ref 0–150)
VLDLC SERPL-MCNC: 13 MG/DL (ref 5–40)
WBC NRBC COR # BLD AUTO: 8.26 10*3/MM3 (ref 3.4–10.8)

## 2024-06-19 PROCEDURE — 80053 COMPREHEN METABOLIC PANEL: CPT | Performed by: FAMILY MEDICINE

## 2024-06-19 PROCEDURE — 82043 UR ALBUMIN QUANTITATIVE: CPT | Performed by: FAMILY MEDICINE

## 2024-06-19 PROCEDURE — 85025 COMPLETE CBC W/AUTO DIFF WBC: CPT | Performed by: FAMILY MEDICINE

## 2024-06-19 PROCEDURE — 83735 ASSAY OF MAGNESIUM: CPT | Performed by: FAMILY MEDICINE

## 2024-06-19 PROCEDURE — 80061 LIPID PANEL: CPT | Performed by: FAMILY MEDICINE

## 2024-06-19 PROCEDURE — 82570 ASSAY OF URINE CREATININE: CPT | Performed by: FAMILY MEDICINE

## 2024-06-19 PROCEDURE — 83036 HEMOGLOBIN GLYCOSYLATED A1C: CPT | Performed by: FAMILY MEDICINE

## 2024-06-19 RX ORDER — METOPROLOL SUCCINATE 50 MG/1
1 TABLET, EXTENDED RELEASE ORAL 2 TIMES DAILY
COMMUNITY
End: 2024-06-19

## 2024-06-19 RX ORDER — GLIPIZIDE 5 MG/1
5 TABLET ORAL
COMMUNITY
Start: 2024-06-17 | End: 2024-07-01

## 2024-06-19 RX ORDER — DAPAGLIFLOZIN 10 MG/1
1 TABLET, FILM COATED ORAL DAILY
COMMUNITY
End: 2024-06-19

## 2024-06-19 RX ORDER — LANOLIN ALCOHOL/MO/W.PET/CERES
1 CREAM (GRAM) TOPICAL DAILY
COMMUNITY
Start: 2024-06-17

## 2024-06-19 NOTE — PROGRESS NOTES
Venipuncture Blood Specimen Collection  Venipuncture performed in left arm  by Rubina Bingham with good hemostasis. Patient tolerated the procedure well without complications.   06/19/24   Rubina Bingham

## 2024-06-19 NOTE — PROGRESS NOTES
"Chief Complaint    Hospital Follow Up Visit (Was seen at [facility] 06/17 for hyperglycemia (glucose was in the 400's after getting steroid injection).) and Diabetes (Is wanting updated labwork.  Is fasting today.  Was told in ER that her A1C was  \"9.1\")    Subjective      Ashli Gutierrez presents to [facility]    History of Present Illness    1.) ER F/U/UNCONTROLLED DIABETES : Recent visit to ER with blood glucose measured in the 400's. Pt was placed on Glipizide. She is also prescribed Metformin. Also prescribed alogloptin, which patient is not taking at this time - she has been 'out of the medication' for some time. Since starting the sulfonylurea - decreasing blood glucose - now measuring in the 200's. She denies any episodes concerning for hypoglycemia. Stable neuropathic sxs of feet. Most recent dilated retinal exam completed 10/2023 - no retinopathy per patient - Jenaro.    Objective     Vital Signs:     /74 (BP Location: Left arm, Patient Position: Sitting, Cuff Size: Adult)   Pulse 79   Temp 97.2 °F (36.2 °C) (Temporal)   Ht 167.6 cm (66\")   Wt 92.1 kg (203 lb)   SpO2 95%   BMI 32.77 kg/m²       Physical Exam  Vitals reviewed.   Constitutional:       General: She is not in acute distress.     Appearance: Normal appearance. She is well-developed.   HENT:      Head: Normocephalic and atraumatic.      Right Ear: Hearing and external ear normal.      Left Ear: Hearing and external ear normal.      Nose: Nose normal.   Eyes:      General: Lids are normal.         Right eye: No discharge.         Left eye: No discharge.      Conjunctiva/sclera: Conjunctivae normal.   Cardiovascular:      Pulses:           Dorsalis pedis pulses are 2+ on the right side and 2+ on the left side.        Posterior tibial pulses are 2+ on the right side and 2+ on the left side.   Pulmonary:      Effort: Pulmonary effort is normal.   Abdominal:      General: There is " no distension.   Musculoskeletal:         General: No swelling.      Cervical back: Neck supple.   Feet:      Right foot:      Protective Sensation: 8 sites tested.  4 sites sensed.      Skin integrity: Skin integrity normal.      Left foot:      Protective Sensation: 8 sites tested.  5 sites sensed.      Skin integrity: Skin integrity normal.      Comments: Onychomycosis of bilateral great toes - followed and being treated per podiatry  Skin:     Coloration: Skin is not jaundiced.      Findings: No erythema.   Neurological:      Mental Status: She is alert. Mental status is at baseline.   Psychiatric:         Mood and Affect: Mood and affect normal.         Thought Content: Thought content normal.     Assessment and Plan     Diagnoses and all orders for this visit:    1. Type 2 diabetes mellitus with diabetic neuropathy, without long-term current use of insulin (Primary)  Comments:  Continue sulfonylurea and Metformin as prescribed. Continue with diet adjust. Will mail handouts. Advise regarding daily foot checks. Additional recs per labs.  Orders:  -     Hemoglobin A1c  -     Lipid Panel  -     CBC & Differential  -     Comprehensive Metabolic Panel  -     Microalbumin / Creatinine Urine Ratio - Urine, Clean Catch    2. Hypomagnesemia  Comments:  Noted in ER - repeat testing as noted.  Orders:  -     Magnesium    3. Encounter for screening mammogram for malignant neoplasm of breast  -     Mammo Screening Digital Tomosynthesis Bilateral With CAD; Future    Follow Up     Return in about 3 months (around 9/19/2024).    Patient was given instructions and counseling regarding her condition or for health maintenance advice. Please see specific information pulled into the AVS if appropriate.

## 2024-06-25 ENCOUNTER — TELEPHONE (OUTPATIENT)
Dept: FAMILY MEDICINE CLINIC | Facility: CLINIC | Age: 54
End: 2024-06-25

## 2024-06-25 RX ORDER — LANOLIN ALCOHOL/MO/W.PET/CERES
1 CREAM (GRAM) TOPICAL DAILY
Qty: 30 TABLET | Refills: 11 | Status: SHIPPED | OUTPATIENT
Start: 2024-06-25

## 2024-06-25 RX ORDER — LANOLIN ALCOHOL/MO/W.PET/CERES
1 CREAM (GRAM) TOPICAL DAILY
Qty: 30 TABLET | Status: CANCELLED | OUTPATIENT
Start: 2024-06-25

## 2024-06-25 RX ORDER — GLIPIZIDE 5 MG/1
5 TABLET ORAL
Status: CANCELLED | OUTPATIENT
Start: 2024-06-25 | End: 2024-07-09

## 2024-06-25 NOTE — TELEPHONE ENCOUNTER
Caller: Ashli Gutierrez    Relationship: Self    Best call back number: [phone]     Requested Prescriptions:   Requested Prescriptions     Pending Prescriptions Disp Refills    glipizide (GLUCOTROL) 5 MG tablet       Sig: Take 1 tablet by mouth.    Magnesium Oxide -Mg Supplement 400 (240 Mg) MG tablet 30 tablet      Sig: Take 1 tablet by mouth Daily.        Pharmacy where request should be sent: [id number] - [address] - [phone] [facility] [phone] FX     Last office visit with prescribing clinician: 6/19/2024   Last telemedicine visit with prescribing clinician: Visit date not found   Next office visit with prescribing clinician: Visit date not found     Additional details provided by patient:   GLIPIZIDE- AT VISIT PATIENT DISCUSSED WITH DR. HARPER AND SHE AGREED TO SEND IN SCRIPT.    2.   MAGNESIUM- ONLY TWO DOSES LEFT    Does the patient have less than a 3 day supply:  [x] Yes  [] No    Would you like a call back once the refill request has been completed: [] Yes [] No    If the office needs to give you a call back, can they leave a voicemail: [] Yes [x] No    William Norton Rep   06/25/24 11:41 EDT         JAMALT NO, CALL PREFERRED, NO VOICEMAIL, CONTINUE TO CALL IF NO ANSWER.

## 2024-06-25 NOTE — TELEPHONE ENCOUNTER
I called patient and she was not home at the time. She will call when she gets back to her house and can read from the bottle of glipizide her dose/frequency and she will call and leave that message

## 2024-06-25 NOTE — TELEPHONE ENCOUNTER
Caller: Ashli Gutierrez    Relationship: Self    Best call back number: [phone]     What is the best time to reach you: ANYTIME    Who are you requesting to speak with (clinical staff, provider,  specific staff member): CLINICAL    What was the call regarding:   WOULD LIKE A CALL TO DISCUSS RECENT LAB RESULTS.   WOULD LIKE A CALL TO DISCUSS DOSAGE ON GLIPIZIDE.    Is it okay if the provider responds through [employer]: NO, CALL PREFERRED, NO VOICEMAIL, CONTINUE TO CALL IF NO ANSWER.     
Called patient back. She had not yet received the letter mailed on 6/20/24. I read that to her, so she is aware of her results as well as that dr yan needed verification on her glipizide medication dose and frequency. She will call back later on with that information   
- C/w phenytoin and baclofen

## 2024-06-27 ENCOUNTER — TELEPHONE (OUTPATIENT)
Dept: FAMILY MEDICINE CLINIC | Facility: CLINIC | Age: 54
End: 2024-06-27

## 2024-06-27 NOTE — TELEPHONE ENCOUNTER
Caller: Ashli Gutierrez    Relationship to patient: Self    Best call back number: [phone]     Patient is needing: RETURNING CALL ABOUT MEDICATION. STATED SHE WAS PRESCRIBED GLIPIZIDE 5MG ONCE DAILY AT THE EMERGENCY ROOM.     PLEASE ADVISE.

## 2024-06-28 RX ORDER — GLIPIZIDE 5 MG/1
5 TABLET ORAL DAILY
Qty: 90 TABLET | Refills: 1 | Status: SHIPPED | OUTPATIENT
Start: 2024-06-28 | End: 2024-09-26

## 2024-08-10 RX ORDER — ONDANSETRON 4 MG/1
4 TABLET, FILM COATED ORAL EVERY 8 HOURS PRN
Qty: 15 TABLET | Refills: 2 | Status: SHIPPED | OUTPATIENT
Start: 2024-08-10

## 2024-10-02 ENCOUNTER — PATIENT OUTREACH (OUTPATIENT)
Dept: FAMILY MEDICINE CLINIC | Facility: CLINIC | Age: 54
End: 2024-10-02

## 2024-10-11 ENCOUNTER — APPOINTMENT (OUTPATIENT)
Dept: GENERAL RADIOLOGY | Facility: HOSPITAL | Age: 54
End: 2024-10-11

## 2024-10-11 ENCOUNTER — HOSPITAL ENCOUNTER (EMERGENCY)
Facility: HOSPITAL | Age: 54
Discharge: HOME | End: 2024-10-11
Attending: EMERGENCY MEDICINE

## 2024-10-11 VITALS
SYSTOLIC BLOOD PRESSURE: 139 MMHG | HEART RATE: 71 BPM | RESPIRATION RATE: 12 BRPM | BODY MASS INDEX: 33.38 KG/M2 | OXYGEN SATURATION: 96 % | HEIGHT: 66 IN | TEMPERATURE: 97.8 F | DIASTOLIC BLOOD PRESSURE: 84 MMHG | WEIGHT: 207.67 LBS

## 2024-10-11 DIAGNOSIS — R07.9 CHEST PAIN, UNSPECIFIED TYPE: Primary | ICD-10-CM

## 2024-10-11 LAB
ALBUMIN SERPL-MCNC: 4 G/DL (ref 3.5–5.2)
ALBUMIN/GLOB SERPL: 1.4 G/DL
ALP SERPL-CCNC: 77 U/L (ref 39–117)
ALT SERPL W P-5'-P-CCNC: 18 U/L (ref 1–33)
ANION GAP SERPL CALCULATED.3IONS-SCNC: 12.1 MMOL/L (ref 5–15)
AST SERPL-CCNC: 24 U/L (ref 1–32)
BASOPHILS # BLD AUTO: 0.04 10*3/MM3 (ref 0–0.2)
BASOPHILS NFR BLD AUTO: 0.8 % (ref 0–1.5)
BILIRUB SERPL-MCNC: 0.9 MG/DL (ref 0–1.2)
BUN SERPL-MCNC: 12 MG/DL (ref 6–20)
BUN/CREAT SERPL: 19.7 (ref 7–25)
CALCIUM SPEC-SCNC: 9.3 MG/DL (ref 8.6–10.5)
CHLORIDE SERPL-SCNC: 104 MMOL/L (ref 98–107)
CO2 SERPL-SCNC: 26.9 MMOL/L (ref 22–29)
CREAT SERPL-MCNC: 0.61 MG/DL (ref 0.57–1)
DEPRECATED RDW RBC AUTO: 39.7 FL (ref 37–54)
EGFRCR SERPLBLD CKD-EPI 2021: 107.1 ML/MIN/1.73
EOSINOPHIL # BLD AUTO: 0.1 10*3/MM3 (ref 0–0.4)
EOSINOPHIL NFR BLD AUTO: 2.1 % (ref 0.3–6.2)
ERYTHROCYTE [DISTWIDTH] IN BLOOD BY AUTOMATED COUNT: 13.1 % (ref 12.3–15.4)
GEN 5 2HR TROPONIN T REFLEX: 21 NG/L
GLOBULIN UR ELPH-MCNC: 2.8 GM/DL
GLUCOSE SERPL-MCNC: 156 MG/DL (ref 65–99)
HCT VFR BLD AUTO: 39.9 % (ref 34–46.6)
HGB BLD-MCNC: 13.1 G/DL (ref 12–15.9)
HOLD SPECIMEN: NORMAL
HOLD SPECIMEN: NORMAL
IMM GRANULOCYTES # BLD AUTO: 0.01 10*3/MM3 (ref 0–0.05)
IMM GRANULOCYTES NFR BLD AUTO: 0.2 % (ref 0–0.5)
LIPASE SERPL-CCNC: 25 U/L (ref 13–60)
LYMPHOCYTES # BLD AUTO: 1.46 10*3/MM3 (ref 0.7–3.1)
LYMPHOCYTES NFR BLD AUTO: 30.7 % (ref 19.6–45.3)
MAGNESIUM SERPL-MCNC: 1.7 MG/DL (ref 1.6–2.6)
MCH RBC QN AUTO: 27.5 PG (ref 26.6–33)
MCHC RBC AUTO-ENTMCNC: 32.8 G/DL (ref 31.5–35.7)
MCV RBC AUTO: 83.8 FL (ref 79–97)
MONOCYTES # BLD AUTO: 0.34 10*3/MM3 (ref 0.1–0.9)
MONOCYTES NFR BLD AUTO: 7.2 % (ref 5–12)
NEUTROPHILS NFR BLD AUTO: 2.8 10*3/MM3 (ref 1.7–7)
NEUTROPHILS NFR BLD AUTO: 59 % (ref 42.7–76)
NRBC BLD AUTO-RTO: 0 /100 WBC (ref 0–0.2)
NT-PROBNP SERPL-MCNC: 109.6 PG/ML (ref 0–900)
PLATELET # BLD AUTO: 207 10*3/MM3 (ref 140–450)
PMV BLD AUTO: 10.4 FL (ref 6–12)
POTASSIUM SERPL-SCNC: 3.7 MMOL/L (ref 3.5–5.2)
PROT SERPL-MCNC: 6.8 G/DL (ref 6–8.5)
QT INTERVAL: 431 MS
QT INTERVAL: 465 MS
QTC INTERVAL: 458 MS
QTC INTERVAL: 480 MS
RBC # BLD AUTO: 4.76 10*6/MM3 (ref 3.77–5.28)
SODIUM SERPL-SCNC: 143 MMOL/L (ref 136–145)
TROPONIN T DELTA: 1 NG/L
TROPONIN T SERPL HS-MCNC: 20 NG/L
WBC NRBC COR # BLD AUTO: 4.75 10*3/MM3 (ref 3.4–10.8)
WHOLE BLOOD HOLD COAG: NORMAL
WHOLE BLOOD HOLD SPECIMEN: NORMAL

## 2024-10-11 PROCEDURE — 85025 COMPLETE CBC W/AUTO DIFF WBC: CPT

## 2024-10-11 PROCEDURE — 84484 ASSAY OF TROPONIN QUANT: CPT | Performed by: EMERGENCY MEDICINE

## 2024-10-11 PROCEDURE — 36415 COLL VENOUS BLD VENIPUNCTURE: CPT

## 2024-10-11 PROCEDURE — 83735 ASSAY OF MAGNESIUM: CPT | Performed by: EMERGENCY MEDICINE

## 2024-10-11 PROCEDURE — 83880 ASSAY OF NATRIURETIC PEPTIDE: CPT | Performed by: EMERGENCY MEDICINE

## 2024-10-11 PROCEDURE — 93005 ELECTROCARDIOGRAM TRACING: CPT | Performed by: EMERGENCY MEDICINE

## 2024-10-11 PROCEDURE — 80053 COMPREHEN METABOLIC PANEL: CPT | Performed by: EMERGENCY MEDICINE

## 2024-10-11 PROCEDURE — 83690 ASSAY OF LIPASE: CPT | Performed by: EMERGENCY MEDICINE

## 2024-10-11 PROCEDURE — 99284 EMERGENCY DEPT VISIT MOD MDM: CPT

## 2024-10-11 PROCEDURE — 71045 X-RAY EXAM CHEST 1 VIEW: CPT

## 2024-10-11 PROCEDURE — 93005 ELECTROCARDIOGRAM TRACING: CPT

## 2024-10-11 RX ORDER — SODIUM CHLORIDE 0.9 % (FLUSH) 0.9 %
10 SYRINGE (ML) INJECTION AS NEEDED
Status: DISCONTINUED | OUTPATIENT
Start: 2024-10-11 | End: 2024-10-11 | Stop reason: HOSPADM

## 2024-10-11 NOTE — ED NOTES
Ems states pt woke up an hour ago with chest pain going across chest and down both arms and under armpits. Pt took 1 nitro and it relieved chest pain for 5 minutes and then  came back. EMS gave 324 ASA and 2nd Nitro. Pt now denies chest pain.

## 2024-10-11 NOTE — ED PROVIDER NOTES
Time: 7:07 AM EDT  Date of encounter:  10/11/2024  Independent Historian/Clinical History and Information was obtained by:   Patient    History is limited by: N/A    Chief Complaint: Chest pain      History of Present Illness:  Patient is a 54 y.o. year old female who presents to the emergency department for evaluation of chest pain.  Patient reports previous MI approximately 11 years ago.  She does still follow with a cardiologist and is compliant with her medications.  She does report she been working excessively long hours last couple of weeks and is unsure if this is playing a role.  She did take nitroglycerin prior to arrival which has resulted in improvement of her chest pain.      Patient Care Team  Primary Care Provider: Jason Yousif DO    Past Medical History:     Allergies   Allergen Reactions    Naproxen Shortness Of Breath and Hives    Penicillins Shortness Of Breath and Hives    Adhesive Tape Rash     tegaderm Skin irritation     Ciprofloxacin Hives     Shortness of breath    Contrast Dye (Echo Or Unknown Ct/Mr) Hives     Shortness of breath    Iodinated Contrast Media Rash    Metronidazole Hives     Shortness of breath     Past Medical History:   Diagnosis Date    Arthritis     Bladder disorder     CAD (coronary artery disease)     Chronic low back pain     Diabetes mellitus     GERD (gastroesophageal reflux disease)     Heart attack     Hyperlipidemia     Hypertension     Neurologic disorder     Onychomycosis     Sciatica     Thyroid disorder      Past Surgical History:   Procedure Laterality Date    CAROTID STENT       SECTION      CHOLECYSTECTOMY      COLONOSCOPY      Dr. Caraballo    CORONARY STENT PLACEMENT      UPPER GASTROINTESTINAL ENDOSCOPY      Dr. Caraballo    US GUIDED FINE NEEDLE ASPIRATION  2019     Family History   Problem Relation Age of Onset    Colon cancer Mother 52    Colon polyps Mother     Sleep apnea Mother     Insomnia Mother     Sleep apnea Sister         Home Medications:  Prior to Admission medications    Medication Sig Start Date End Date Taking? Authorizing Provider   acetaminophen (TYLENOL) 325 MG tablet Take 2 tablets by mouth Every 6 (Six) Hours As Needed for Mild Pain.    Alia Araujo MD   Alogliptin Benzoate 12.5 MG tablet TAKE 1 TABLET BY MOUTH EVERY DAY 3/15/24   Jason Yousif DO   aspirin 81 MG EC tablet Take 1 tablet by mouth Daily. 3/25/24   Jason Yousif DO   atorvastatin (LIPITOR) 80 MG tablet Take 1 tablet by mouth Daily. 3/25/24   Jason Yousif DO   cetirizine (Allergy Relief Cetirizine) 10 MG tablet Take 1 tablet by mouth Daily. 3/25/24   Jason Yousif DO   clopidogrel (PLAVIX) 75 MG tablet Take 1 tablet by mouth Daily. 3/25/24   Jason Yousif DO   dicyclomine (Bentyl) 10 MG capsule Take 1 capsule by mouth Every 6 (Six) Hours As Needed for Abdominal Cramping (diarrhea). 9/8/23   Eden Perkins APRN   fluticasone (CUTIVATE) 0.005 % ointment Apply 1 application  topically to the appropriate area as directed 2 (Two) Times a Day. 9/22/23   Jason Yousif DO   gabapentin (NEURONTIN) 400 MG capsule  3/6/23   Alia Araujo MD   glipizide (GLUCOTROL) 5 MG tablet Take 1 tablet by mouth Daily for 90 days. 6/28/24 9/26/24  Jason Yousif DO   HYDROcodone-acetaminophen (NORCO) 5-325 MG per tablet TAKE ONE TABLET BY MOUTH FOUR TIMES DAILY AS NEEDED FOR PAIN. FILL DATE 10/22/2021 10/22/21   Alia Araujo MD   isosorbide mononitrate (IMDUR) 60 MG 24 hr tablet Take 1 tablet by mouth Daily. 3/25/24   Jason Yousif DO   Lancets misc 1 each 2 (Two) Times a Day. 3/8/22   Jason Yousif DO   losartan (Cozaar) 100 MG tablet Take 1 tablet by mouth Daily for 90 days. 5/10/24 8/8/24  Jason Yousif DO   Magnesium Oxide -Mg Supplement 400 (240 Mg) MG tablet Take 1 tablet by mouth Daily. 6/25/24   Jason Yousif,    metFORMIN (GLUCOPHAGE) 1000 MG tablet Take 1 tablet by mouth 2 (Two) Times a Day With Meals. 3/25/24   Jason Yousif,  DO   metoprolol tartrate (LOPRESSOR) 25 MG tablet Take 1 tablet by mouth 2 (Two) Times a Day. 3/25/24   Jason Yousif DO   montelukast (SINGULAIR) 10 MG tablet Take 1 tablet by mouth Daily. 3/25/24   Jason Yousif DO   nitroglycerin (Nitrostat) 0.4 MG SL tablet Take 1 tablet by mouth As Needed for Chest Pain. 3/25/24   Jason Yousif DO   ondansetron (ZOFRAN) 4 MG tablet TAKE 1 TABLET BY MOUTH EVERY 8 HOURS AS NEEDED FOR NAUSEA AND VOMITING 8/10/24   Jason Yousif DO   pantoprazole (PROTONIX) 20 MG EC tablet Take 1 tablet by mouth Daily. 3/25/24   Jason Yousif DO   tiZANidine (ZANAFLEX) 4 MG tablet Take 1 tablet by mouth Every 8 (Eight) Hours As Needed for Muscle Spasms. 23   Jason Yousif DO        Social History:   Social History     Tobacco Use    Smoking status: Former     Current packs/day: 0.00     Average packs/day: 1 pack/day for 12.0 years (12.0 ttl pk-yrs)     Types: Cigarettes     Start date:      Quit date:      Years since quittin.7     Passive exposure: Past    Smokeless tobacco: Never   Vaping Use    Vaping status: Some Days    Substances: Nicotine   Substance Use Topics    Alcohol use: Yes     Comment: occasionally     Drug use: Never         Review of Systems:  Review of Systems   Constitutional:  Negative for chills and fever.   HENT:  Negative for congestion, rhinorrhea and sore throat.    Eyes:  Negative for pain and visual disturbance.   Respiratory:  Negative for apnea, cough, chest tightness and shortness of breath.    Cardiovascular:  Negative for chest pain and palpitations.   Gastrointestinal:  Negative for abdominal pain, diarrhea, nausea and vomiting.   Genitourinary:  Negative for difficulty urinating and dysuria.   Musculoskeletal:  Negative for joint swelling and myalgias.   Skin:  Negative for color change.   Neurological:  Negative for seizures and headaches.   Psychiatric/Behavioral: Negative.     All other systems reviewed and are negative.       Physical  "Exam:  /84 (BP Location: Left arm, Patient Position: Lying)   Pulse 71   Temp 97.8 °F (36.6 °C) (Oral)   Resp 12   Ht 167.6 cm (66\")   Wt 94.2 kg (207 lb 10.8 oz)   SpO2 96%   BMI 33.52 kg/m²     Physical Exam  Vitals and nursing note reviewed.   Constitutional:       General: She is not in acute distress.     Appearance: Normal appearance. She is not toxic-appearing.   HENT:      Head: Normocephalic and atraumatic.      Jaw: There is normal jaw occlusion.   Eyes:      General: Lids are normal.      Extraocular Movements: Extraocular movements intact.      Conjunctiva/sclera: Conjunctivae normal.      Pupils: Pupils are equal, round, and reactive to light.   Cardiovascular:      Rate and Rhythm: Normal rate and regular rhythm.      Pulses: Normal pulses.      Heart sounds: Normal heart sounds.   Pulmonary:      Effort: Pulmonary effort is normal. No respiratory distress.      Breath sounds: Normal breath sounds. No wheezing or rhonchi.   Abdominal:      General: Abdomen is flat.      Palpations: Abdomen is soft.      Tenderness: There is no abdominal tenderness. There is no guarding or rebound.   Musculoskeletal:         General: Normal range of motion.      Cervical back: Normal range of motion and neck supple.      Right lower leg: No edema.      Left lower leg: No edema.   Skin:     General: Skin is warm and dry.   Neurological:      Mental Status: She is alert and oriented to person, place, and time. Mental status is at baseline.   Psychiatric:         Mood and Affect: Mood normal.                  Procedures:  Procedures      Medical Decision Making:      Comorbidities that affect care:    Coronary Artery Disease, Diabetes, Hypertension    External Notes reviewed:    Previous Clinic Note: Family medicine office visit for diabetes management      The following orders were placed and all results were independently analyzed by me:  Orders Placed This Encounter   Procedures    XR Chest 1 View    " [place] Draw    High Sensitivity Troponin T    Comprehensive Metabolic Panel    Lipase    BNP    Magnesium    CBC Auto Differential    High Sensitivity Troponin T 2Hr    NPO Diet NPO Type: Strict NPO    Undress & Gown    Continuous Pulse Oximetry    Oxygen Therapy- Nasal Cannula; Titrate 1-6 LPM Per SpO2; 90 - 95%    ECG 12 Lead ED Triage Standing Order; Chest Pain    ECG 12 Lead ED Triage Standing Order; Chest Pain    Insert Peripheral IV    CBC & Differential    Green Top (Gel)    Lavender Top    Gold Top - SST    Light Blue Top       Medications Given in the Emergency Department:  Medications   sodium chloride 0.9 % flush 10 mL (has no administration in time range)        ED Course:         Labs:    Lab Results (last 24 hours)       Procedure Component Value Units Date/Time    High Sensitivity Troponin T [[id number]]  (Abnormal) Collected: 10/11/24 0549    Specimen: Blood Updated: 10/11/24 0635     HS Troponin T 20 ng/L     Narrative:      High Sensitive Troponin T Reference Range:  <14.0 ng/L- Negative Female for AMI  <22.0 ng/L- Negative Male for AMI  >=14 - Abnormal Female indicating possible myocardial injury.  >=22 - Abnormal Male indicating possible myocardial injury.   Clinicians would have to utilize clinical acumen, EKG, Troponin, and serial changes to determine if it is an Acute Myocardial Infarction or myocardial injury due to an underlying chronic condition.         CBC & Differential [[id number]]  (Normal) Collected: 10/11/24 0549    Specimen: Blood Updated: 10/11/24 0559    Narrative:      The following orders were created for panel order CBC & Differential.  Procedure                               Abnormality         Status                     ---------                               -----------         ------                     CBC Auto Differential[[id number]]        Normal              Final result                 Please view results for these tests on the individual orders.    Comprehensive  Metabolic Panel [[id number]]  (Abnormal) Collected: 10/11/24 0549    Specimen: Blood Updated: 10/11/24 0640     Glucose 156 mg/dL      BUN 12 mg/dL      Creatinine 0.61 mg/dL      Sodium 143 mmol/L      Potassium 3.7 mmol/L      Chloride 104 mmol/L      CO2 26.9 mmol/L      Calcium 9.3 mg/dL      Total Protein 6.8 g/dL      Albumin 4.0 g/dL      ALT (SGPT) 18 U/L      AST (SGOT) 24 U/L      Alkaline Phosphatase 77 U/L      Total Bilirubin 0.9 mg/dL      Globulin 2.8 gm/dL      A/G Ratio 1.4 g/dL      BUN/Creatinine Ratio 19.7     Anion Gap 12.1 mmol/L      eGFR 107.1 mL/min/1.73     Narrative:      GFR Normal >60  Chronic Kidney Disease <60  Kidney Failure <15      Lipase [[id number]]  (Normal) Collected: 10/11/24 0549    Specimen: Blood Updated: 10/11/24 0640     Lipase 25 U/L     BNP [[id number]]  (Normal) Collected: 10/11/24 0549    Specimen: Blood Updated: 10/11/24 0616     proBNP 109.6 pg/mL     Narrative:      This assay is used as an aid in the diagnosis of individuals suspected of having heart failure. It can be used as an aid in the diagnosis of acute decompensated heart failure (ADHF) in patients presenting with signs and symptoms of ADHF to the emergency department (ED). In addition, NT-proBNP of <300 pg/mL indicates ADHF is not likely.    Age Range Result Interpretation  NT-proBNP Concentration (pg/mL:      <50             Positive            >450                   Gray                 300-450                    Negative             <300    50-75           Positive            >900                  Gray                300-900                  Negative            <300      >75             Positive            >1800                  Gray                300-1800                  Negative            <300    Magnesium [[id number]]  (Normal) Collected: 10/11/24 0549    Specimen: Blood Updated: 10/11/24 0640     Magnesium 1.7 mg/dL     CBC Auto Differential [[id number]]  (Normal) Collected: 10/11/24 0549    Specimen:  Blood Updated: 10/11/24 0559     WBC 4.75 10*3/mm3      RBC 4.76 10*6/mm3      Hemoglobin 13.1 g/dL      Hematocrit 39.9 %      MCV 83.8 fL      MCH 27.5 pg      MCHC 32.8 g/dL      RDW 13.1 %      RDW-SD 39.7 fl      MPV 10.4 fL      Platelets 207 10*3/mm3      Neutrophil % 59.0 %      Lymphocyte % 30.7 %      Monocyte % 7.2 %      Eosinophil % 2.1 %      Basophil % 0.8 %      Immature Grans % 0.2 %      Neutrophils, Absolute 2.80 10*3/mm3      Lymphocytes, Absolute 1.46 10*3/mm3      Monocytes, Absolute 0.34 10*3/mm3      Eosinophils, Absolute 0.10 10*3/mm3      Basophils, Absolute 0.04 10*3/mm3      Immature Grans, Absolute 0.01 10*3/mm3      nRBC 0.0 /100 WBC     High Sensitivity Troponin T 2Hr [[id number]]  (Abnormal) Collected: 10/11/24 0827    Specimen: Blood from Arm, Right Updated: 10/11/24 0854     HS Troponin T 21 ng/L      Troponin T Delta 1 ng/L     Narrative:      High Sensitive Troponin T Reference Range:  <14.0 ng/L- Negative Female for AMI  <22.0 ng/L- Negative Male for AMI  >=14 - Abnormal Female indicating possible myocardial injury.  >=22 - Abnormal Male indicating possible myocardial injury.   Clinicians would have to utilize clinical acumen, EKG, Troponin, and serial changes to determine if it is an Acute Myocardial Infarction or myocardial injury due to an underlying chronic condition.                  Imaging:    XR Chest 1 View    Result Date: 10/11/2024  XR CHEST 1 VW Date of exam: 10/11/2024, 6:00 A.M., EDT. Indication: Chest pain. Comparison: None available. FINDINGS: A single frontal (AP or PA upright portable) chest radiograph reveals no cardiac enlargement and no acute infiltrate. No pneumothorax is seen. No pleural effusion. The thoracic aorta is atherosclerotic. Coronary artery calcifications are seen. Endovascular stents are in place within the coronary arteries. There is slight pulmonary hypoinflation. External artifacts obscure detail.     No acute cardiopulmonary disease is seen  radiographically.    Portions of this note were completed with a voice recognition program.   Electronically Signed: Lamin Corbett MD  10/11/2024 6:09 AM EDT  Workstation ID: [id number]       Differential Diagnosis and Discussion:    Chest Pain:  Based on the patient's signs and symptoms, I considered aortic dissection, myocardial infaction, pulmonary embolism, cardiac tamponade, pericarditis, pneumothorax, musculoskeletal chest pain and other differential diagnosis as an etiology of the patient's chest pain.     All labs were reviewed and interpreted by me.  All X-rays impressions were independently interpreted by me.  EKG was interpreted by me.    MDM  Number of Diagnoses or Management Options  Chest pain, unspecified type  Diagnosis management comments: In summary this is a 54-year-old female who presents to the emergency department for evaluation of chest discomfort.  She does report she has been working long hours lately.  Does also have a history of CAD.  CBC independently reviewed and interpreted by me and shows no critical abnormalities.  CMP independently reviewed and interpreted by me and shows no critical abnormalities.  High-sensitivity troponin independent reviewed interpreted by me is slightly higher than expected but is flat across 2 with a delta of only 1 which is not statistically significant.  Chest x-ray reviewed by me is unremarkable.  Patient's been instructed to follow-up with her cardiologist.  Very strict return to ER and follow-up instructions have been provided to the patient.                         Patient Care Considerations:    CONSULT: I considered consulting cardiology, however negative cardiac workup      Consultants/Shared Management Plan:    None    Social Determinants of Health:    Patient is independent, reliable, and has access to care.       Disposition and Care Coordination:    Discharged: I considered escalation of care by admitting this patient to the hospital, however  negative cardiac workup    I have explained the patient´s condition, diagnoses and treatment plan based on the information available to me at this time. I have answered questions and addressed any concerns. The patient has a good  understanding of the patient´s diagnosis, condition, and treatment plan as can be expected at this point. The vital signs have been stable. The patient´s condition is stable and appropriate for discharge from the emergency department.      The patient will pursue further outpatient evaluation with the primary care physician or other designated or consulting physician as outlined in the discharge instructions. They are agreeable to this plan of care and follow-up instructions have been explained in detail. The patient has received these instructions in written format and has expressed an understanding of the discharge instructions. The patient is aware that any significant change in condition or worsening of symptoms should prompt an immediate return to this or the closest emergency department or call to 911.  I have explained discharge medications and the need for follow up with the patient/caretakers. This was also printed in the discharge instructions. Patient was discharged with the following medications and follow up:      Medication List      No changes were made to your prescriptions during this visit.      Jason Yousif  [address] [place] [address] [phone]    In 1 week         Final diagnoses:   Chest pain, unspecified type        ED Disposition       ED Disposition   Discharge    Condition   Stable    Comment   --               This medical record created using voice recognition software.             Toribio Ruiz MD  10/11/24 8833

## 2024-10-16 RX ORDER — CLOPIDOGREL BISULFATE 75 MG/1
75 TABLET ORAL DAILY
Qty: 90 TABLET | Refills: 1 | Status: SHIPPED | OUTPATIENT
Start: 2024-10-16

## 2024-10-16 RX ORDER — METOPROLOL TARTRATE 25 MG/1
25 TABLET, FILM COATED ORAL 2 TIMES DAILY
Qty: 120 TABLET | Refills: 2 | Status: SHIPPED | OUTPATIENT
Start: 2024-10-16

## 2024-10-24 LAB
QT INTERVAL: 431 MS
QT INTERVAL: 465 MS
QTC INTERVAL: 458 MS
QTC INTERVAL: 480 MS

## 2024-11-15 RX ORDER — LOSARTAN POTASSIUM 100 MG/1
100 TABLET ORAL DAILY
Qty: 90 TABLET | Refills: 1 | Status: SHIPPED | OUTPATIENT
Start: 2024-11-15 | End: 2025-02-13

## 2024-11-15 RX ORDER — ISOSORBIDE MONONITRATE 60 MG/1
60 TABLET, EXTENDED RELEASE ORAL DAILY
Qty: 90 TABLET | Refills: 1 | Status: SHIPPED | OUTPATIENT
Start: 2024-11-15

## 2024-11-15 RX ORDER — ATORVASTATIN CALCIUM 80 MG/1
80 TABLET, FILM COATED ORAL DAILY
Qty: 90 TABLET | Refills: 1 | Status: SHIPPED | OUTPATIENT
Start: 2024-11-15

## 2024-11-15 NOTE — TELEPHONE ENCOUNTER
Caller: Ashli Gutierrez    Relationship: Self    Best call back number: [phone]     Requested Prescriptions:   Requested Prescriptions     Pending Prescriptions Disp Refills    losartan (Cozaar) 100 MG tablet 90 tablet 1     Sig: Take 1 tablet by mouth Daily for 90 days.    atorvastatin (LIPITOR) 80 MG tablet 90 tablet 1     Sig: Take 1 tablet by mouth Daily.    isosorbide mononitrate (IMDUR) 60 MG 24 hr tablet 90 tablet 1     Sig: Take 1 tablet by mouth Daily.        [id number] [place] [address] - [phone] [facility] [phone] FX     Last office visit with prescribing clinician: 6/19/2024   Last telemedicine visit with prescribing clinician: Visit date not found   Next office visit with prescribing clinician: Visit date not found     Additional details provided by patient: OUT OF REFILLS    PATIENT WOULD ALSO LIKE ANY OTHER MEDICATION THAT IS OUT OF REFILLS TO SEND THEM IN AS WELL.    PATIENT WILL NOT HAVE INSURANCE AGAIN UNTIL JANUARY. SHE WILL MAKE AN APPOINTMENT THEM TO FOLLOW UP.    William Norton Rep   11/15/24 09:54 EST

## 2025-02-03 RX ORDER — CLOPIDOGREL BISULFATE 75 MG/1
75 TABLET ORAL DAILY
Qty: 90 TABLET | Refills: 1 | Status: SHIPPED | OUTPATIENT
Start: 2025-02-03 | End: 2025-02-05 | Stop reason: SDUPTHER

## 2025-02-05 ENCOUNTER — OFFICE VISIT (OUTPATIENT)
Dept: FAMILY MEDICINE CLINIC | Facility: CLINIC | Age: 55
End: 2025-02-05
Payer: COMMERCIAL

## 2025-02-05 VITALS
TEMPERATURE: 97.4 F | HEIGHT: 66 IN | OXYGEN SATURATION: 94 % | HEART RATE: 103 BPM | BODY MASS INDEX: 32.14 KG/M2 | WEIGHT: 200 LBS

## 2025-02-05 DIAGNOSIS — I10 PRIMARY HYPERTENSION: ICD-10-CM

## 2025-02-05 DIAGNOSIS — R30.0 DYSURIA: ICD-10-CM

## 2025-02-05 DIAGNOSIS — E11.65 UNCONTROLLED TYPE 2 DIABETES MELLITUS WITH HYPERGLYCEMIA: Primary | ICD-10-CM

## 2025-02-05 DIAGNOSIS — Z12.31 ENCOUNTER FOR SCREENING MAMMOGRAM FOR MALIGNANT NEOPLASM OF BREAST: ICD-10-CM

## 2025-02-05 DIAGNOSIS — E78.5 HYPERLIPIDEMIA, UNSPECIFIED HYPERLIPIDEMIA TYPE: ICD-10-CM

## 2025-02-05 PROBLEM — I25.2 HISTORY OF MYOCARDIAL INFARCTION: Status: ACTIVE | Noted: 2025-02-05

## 2025-02-05 PROBLEM — R15.2 INCONTINENCE OF FECES WITH FECAL URGENCY: Status: RESOLVED | Noted: 2021-10-25 | Resolved: 2025-02-05

## 2025-02-05 PROBLEM — I21.9 MYOCARDIAL INFARCTION: Status: RESOLVED | Noted: 2024-04-16 | Resolved: 2025-02-05

## 2025-02-05 PROBLEM — R15.9 INCONTINENCE OF FECES WITH FECAL URGENCY: Status: RESOLVED | Noted: 2021-10-25 | Resolved: 2025-02-05

## 2025-02-05 LAB
ALBUMIN SERPL-MCNC: 4.1 G/DL (ref 3.5–5.2)
ALBUMIN UR-MCNC: <1.2 MG/DL
ALBUMIN/GLOB SERPL: 1.6 G/DL
ALP SERPL-CCNC: 75 U/L (ref 39–117)
ALT SERPL W P-5'-P-CCNC: 16 U/L (ref 1–33)
ANION GAP SERPL CALCULATED.3IONS-SCNC: 11 MMOL/L (ref 5–15)
AST SERPL-CCNC: 21 U/L (ref 1–32)
BASOPHILS # BLD AUTO: 0.04 10*3/MM3 (ref 0–0.2)
BASOPHILS NFR BLD AUTO: 0.6 % (ref 0–1.5)
BILIRUB BLD-MCNC: NEGATIVE MG/DL
BILIRUB SERPL-MCNC: 0.9 MG/DL (ref 0–1.2)
BUN SERPL-MCNC: 11 MG/DL (ref 6–20)
BUN/CREAT SERPL: 19.3 (ref 7–25)
CALCIUM SPEC-SCNC: 8.9 MG/DL (ref 8.6–10.5)
CHLORIDE SERPL-SCNC: 102 MMOL/L (ref 98–107)
CHOLEST SERPL-MCNC: 119 MG/DL (ref 0–200)
CLARITY, POC: CLEAR
CO2 SERPL-SCNC: 26 MMOL/L (ref 22–29)
COLOR UR: YELLOW
CREAT SERPL-MCNC: 0.57 MG/DL (ref 0.57–1)
CREAT UR-MCNC: 67.6 MG/DL
DEPRECATED RDW RBC AUTO: 38.7 FL (ref 37–54)
EGFRCR SERPLBLD CKD-EPI 2021: 108.8 ML/MIN/1.73
EOSINOPHIL # BLD AUTO: 0.12 10*3/MM3 (ref 0–0.4)
EOSINOPHIL NFR BLD AUTO: 1.9 % (ref 0.3–6.2)
ERYTHROCYTE [DISTWIDTH] IN BLOOD BY AUTOMATED COUNT: 13.2 % (ref 12.3–15.4)
EXPIRATION DATE: NORMAL
GLOBULIN UR ELPH-MCNC: 2.6 GM/DL
GLUCOSE SERPL-MCNC: 112 MG/DL (ref 65–99)
GLUCOSE UR STRIP-MCNC: NEGATIVE MG/DL
HBA1C MFR BLD: 7.2 % (ref 4.8–5.6)
HCT VFR BLD AUTO: 40.4 % (ref 34–46.6)
HDLC SERPL-MCNC: 38 MG/DL (ref 40–60)
HGB BLD-MCNC: 13.7 G/DL (ref 12–15.9)
IMM GRANULOCYTES # BLD AUTO: 0.02 10*3/MM3 (ref 0–0.05)
IMM GRANULOCYTES NFR BLD AUTO: 0.3 % (ref 0–0.5)
KETONES UR QL: NEGATIVE
LDLC SERPL CALC-MCNC: 68 MG/DL (ref 0–100)
LDLC/HDLC SERPL: 1.82 {RATIO}
LEUKOCYTE EST, POC: NEGATIVE
LYMPHOCYTES # BLD AUTO: 2.84 10*3/MM3 (ref 0.7–3.1)
LYMPHOCYTES NFR BLD AUTO: 44 % (ref 19.6–45.3)
Lab: NORMAL
MCH RBC QN AUTO: 27.7 PG (ref 26.6–33)
MCHC RBC AUTO-ENTMCNC: 33.9 G/DL (ref 31.5–35.7)
MCV RBC AUTO: 81.8 FL (ref 79–97)
MICROALBUMIN/CREAT UR: NORMAL MG/G{CREAT}
MONOCYTES # BLD AUTO: 0.35 10*3/MM3 (ref 0.1–0.9)
MONOCYTES NFR BLD AUTO: 5.4 % (ref 5–12)
NEUTROPHILS NFR BLD AUTO: 3.09 10*3/MM3 (ref 1.7–7)
NEUTROPHILS NFR BLD AUTO: 47.8 % (ref 42.7–76)
NITRITE UR-MCNC: NEGATIVE MG/ML
NRBC BLD AUTO-RTO: 0 /100 WBC (ref 0–0.2)
PH UR: 6.5 [PH] (ref 5–8)
PLATELET # BLD AUTO: 224 10*3/MM3 (ref 140–450)
PMV BLD AUTO: 10.9 FL (ref 6–12)
POTASSIUM SERPL-SCNC: 4.4 MMOL/L (ref 3.5–5.2)
PROT SERPL-MCNC: 6.7 G/DL (ref 6–8.5)
PROT UR STRIP-MCNC: NEGATIVE MG/DL
RBC # BLD AUTO: 4.94 10*6/MM3 (ref 3.77–5.28)
RBC # UR STRIP: NEGATIVE /UL
SODIUM SERPL-SCNC: 139 MMOL/L (ref 136–145)
SP GR UR: 1.01 (ref 1–1.03)
TRIGL SERPL-MCNC: 59 MG/DL (ref 0–150)
UROBILINOGEN UR QL: NORMAL
VLDLC SERPL-MCNC: 13 MG/DL (ref 5–40)
WBC NRBC COR # BLD AUTO: 6.46 10*3/MM3 (ref 3.4–10.8)

## 2025-02-05 PROCEDURE — 1126F AMNT PAIN NOTED NONE PRSNT: CPT | Performed by: FAMILY MEDICINE

## 2025-02-05 PROCEDURE — 99214 OFFICE O/P EST MOD 30 MIN: CPT | Performed by: FAMILY MEDICINE

## 2025-02-05 PROCEDURE — 83036 HEMOGLOBIN GLYCOSYLATED A1C: CPT | Performed by: FAMILY MEDICINE

## 2025-02-05 PROCEDURE — 82570 ASSAY OF URINE CREATININE: CPT | Performed by: FAMILY MEDICINE

## 2025-02-05 PROCEDURE — 82043 UR ALBUMIN QUANTITATIVE: CPT | Performed by: FAMILY MEDICINE

## 2025-02-05 PROCEDURE — 80061 LIPID PANEL: CPT | Performed by: FAMILY MEDICINE

## 2025-02-05 PROCEDURE — 80053 COMPREHEN METABOLIC PANEL: CPT | Performed by: FAMILY MEDICINE

## 2025-02-05 PROCEDURE — 85025 COMPLETE CBC W/AUTO DIFF WBC: CPT | Performed by: FAMILY MEDICINE

## 2025-02-05 RX ORDER — DICYCLOMINE HYDROCHLORIDE 10 MG/1
10 CAPSULE ORAL EVERY 6 HOURS PRN
Qty: 120 CAPSULE | Refills: 1 | Status: SHIPPED | OUTPATIENT
Start: 2025-02-05

## 2025-02-05 RX ORDER — ISOSORBIDE MONONITRATE 60 MG/1
60 TABLET, EXTENDED RELEASE ORAL DAILY
Qty: 90 TABLET | Refills: 1 | Status: SHIPPED | OUTPATIENT
Start: 2025-02-05 | End: 2025-02-05

## 2025-02-05 RX ORDER — MONTELUKAST SODIUM 10 MG/1
10 TABLET ORAL DAILY
Qty: 90 TABLET | Refills: 1 | Status: SHIPPED | OUTPATIENT
Start: 2025-02-05 | End: 2025-02-05

## 2025-02-05 RX ORDER — ATORVASTATIN CALCIUM 80 MG/1
80 TABLET, FILM COATED ORAL DAILY
Qty: 90 TABLET | Refills: 1 | Status: SHIPPED | OUTPATIENT
Start: 2025-02-05

## 2025-02-05 RX ORDER — PANTOPRAZOLE SODIUM 20 MG/1
20 TABLET, DELAYED RELEASE ORAL DAILY
Qty: 90 TABLET | Refills: 3 | Status: SHIPPED | OUTPATIENT
Start: 2025-02-05 | End: 2025-02-05

## 2025-02-05 RX ORDER — LOSARTAN POTASSIUM 100 MG/1
100 TABLET ORAL DAILY
Qty: 90 TABLET | Refills: 1 | Status: SHIPPED | OUTPATIENT
Start: 2025-02-05 | End: 2025-05-06

## 2025-02-05 RX ORDER — PANTOPRAZOLE SODIUM 20 MG/1
20 TABLET, DELAYED RELEASE ORAL DAILY
Qty: 90 TABLET | Refills: 3 | Status: SHIPPED | OUTPATIENT
Start: 2025-02-05

## 2025-02-05 RX ORDER — ATORVASTATIN CALCIUM 80 MG/1
80 TABLET, FILM COATED ORAL DAILY
Qty: 90 TABLET | Refills: 1 | Status: SHIPPED | OUTPATIENT
Start: 2025-02-05 | End: 2025-02-05

## 2025-02-05 RX ORDER — GLIPIZIDE 5 MG/1
5 TABLET ORAL DAILY
Qty: 90 TABLET | Refills: 1 | Status: SHIPPED | OUTPATIENT
Start: 2025-02-05 | End: 2025-02-05

## 2025-02-05 RX ORDER — METOPROLOL TARTRATE 25 MG/1
25 TABLET, FILM COATED ORAL 2 TIMES DAILY
Qty: 120 TABLET | Refills: 2 | Status: SHIPPED | OUTPATIENT
Start: 2025-02-05

## 2025-02-05 RX ORDER — NITROGLYCERIN 0.4 MG/1
0.4 TABLET SUBLINGUAL AS NEEDED
Qty: 10 TABLET | Refills: 3 | Status: SHIPPED | OUTPATIENT
Start: 2025-02-05

## 2025-02-05 RX ORDER — NITROGLYCERIN 0.4 MG/1
0.4 TABLET SUBLINGUAL AS NEEDED
Qty: 10 TABLET | Refills: 3 | Status: SHIPPED | OUTPATIENT
Start: 2025-02-05 | End: 2025-02-05

## 2025-02-05 RX ORDER — CLOPIDOGREL BISULFATE 75 MG/1
75 TABLET ORAL DAILY
Qty: 90 TABLET | Refills: 1 | Status: SHIPPED | OUTPATIENT
Start: 2025-02-05 | End: 2025-02-05

## 2025-02-05 RX ORDER — FLUTICASONE PROPIONATE 0.05 MG/G
1 OINTMENT TOPICAL 2 TIMES DAILY
Qty: 30 G | Refills: 3 | Status: SHIPPED | OUTPATIENT
Start: 2025-02-05 | End: 2025-02-05

## 2025-02-05 RX ORDER — LANOLIN ALCOHOL/MO/W.PET/CERES
1 CREAM (GRAM) TOPICAL DAILY
Qty: 90 TABLET | Refills: 1 | Status: SHIPPED | OUTPATIENT
Start: 2025-02-05

## 2025-02-05 RX ORDER — CLOPIDOGREL BISULFATE 75 MG/1
75 TABLET ORAL DAILY
Qty: 90 TABLET | Refills: 1 | Status: SHIPPED | OUTPATIENT
Start: 2025-02-05

## 2025-02-05 RX ORDER — GLIPIZIDE 5 MG/1
5 TABLET ORAL DAILY
Qty: 90 TABLET | Refills: 1 | Status: SHIPPED | OUTPATIENT
Start: 2025-02-05 | End: 2025-05-06

## 2025-02-05 RX ORDER — ISOSORBIDE MONONITRATE 60 MG/1
60 TABLET, EXTENDED RELEASE ORAL DAILY
Qty: 90 TABLET | Refills: 1 | Status: SHIPPED | OUTPATIENT
Start: 2025-02-05

## 2025-02-05 RX ORDER — LANOLIN ALCOHOL/MO/W.PET/CERES
1 CREAM (GRAM) TOPICAL DAILY
Qty: 90 TABLET | Refills: 1 | Status: SHIPPED | OUTPATIENT
Start: 2025-02-05 | End: 2025-02-05

## 2025-02-05 RX ORDER — LOSARTAN POTASSIUM 100 MG/1
100 TABLET ORAL DAILY
Qty: 90 TABLET | Refills: 1 | Status: SHIPPED | OUTPATIENT
Start: 2025-02-05 | End: 2025-02-05

## 2025-02-05 RX ORDER — MONTELUKAST SODIUM 10 MG/1
10 TABLET ORAL DAILY
Qty: 90 TABLET | Refills: 1 | Status: SHIPPED | OUTPATIENT
Start: 2025-02-05

## 2025-02-05 RX ORDER — DICYCLOMINE HYDROCHLORIDE 10 MG/1
10 CAPSULE ORAL EVERY 6 HOURS PRN
Qty: 120 CAPSULE | Refills: 1 | Status: SHIPPED | OUTPATIENT
Start: 2025-02-05 | End: 2025-02-05

## 2025-02-05 RX ORDER — FLUTICASONE PROPIONATE 0.05 MG/G
1 OINTMENT TOPICAL 2 TIMES DAILY
Qty: 30 G | Refills: 3 | Status: SHIPPED | OUTPATIENT
Start: 2025-02-05

## 2025-02-05 RX ORDER — CETIRIZINE HYDROCHLORIDE 10 MG/1
10 TABLET ORAL DAILY
Qty: 90 TABLET | Refills: 3 | Status: SHIPPED | OUTPATIENT
Start: 2025-02-05

## 2025-02-05 RX ORDER — CETIRIZINE HYDROCHLORIDE 10 MG/1
10 TABLET ORAL DAILY
Qty: 90 TABLET | Refills: 3 | Status: SHIPPED | OUTPATIENT
Start: 2025-02-05 | End: 2025-02-05

## 2025-02-05 RX ORDER — METOPROLOL TARTRATE 25 MG/1
25 TABLET, FILM COATED ORAL 2 TIMES DAILY
Qty: 120 TABLET | Refills: 2 | Status: SHIPPED | OUTPATIENT
Start: 2025-02-05 | End: 2025-02-05

## 2025-02-05 NOTE — PROGRESS NOTES
..  Venipuncture Blood Specimen Collection  Venipuncture performed in Lt arm  by Rubina Bingham with good hemostasis. Patient tolerated the procedure well without complications.   02/05/25   Ann Downey MA

## 2025-02-05 NOTE — PROGRESS NOTES
"Chief Complaint    Annual Exam, Diabetes, Hypertension, and Difficulty Urinating    Subjective      Ashli Gutierrez presents to [facility]    1.) NIDDM/HTN/HLD : Most recent A1c measured at 9.0% - 7 mos ago.  No reports of signs or symptoms concerning for hypo- or hyperglycemia, since last visit here in office.  Upcoming dilated retinal exam.  Stable neuropathy of feet, patient denies any new lesions.  Most recent cholesterol panel WNL.  No complaints regarding statin therapy.     Objective     Vital Signs:     Pulse 103   Temp 97.4 °F (36.3 °C) (Temporal)   Ht 167.6 cm (66\")   Wt 90.7 kg (200 lb)   SpO2 94%   BMI 32.28 kg/m²       Physical Exam  Vitals reviewed.   Constitutional:       General: She is not in acute distress.     Appearance: Normal appearance. She is well-developed.   HENT:      Head: Normocephalic and atraumatic.      Right Ear: Hearing and external ear normal.      Left Ear: Hearing and external ear normal.      Nose: Nose normal.   Eyes:      General: Lids are normal.         Right eye: No discharge.         Left eye: No discharge.      Conjunctiva/sclera: Conjunctivae normal.   Cardiovascular:      Rate and Rhythm: Normal rate and regular rhythm.   Pulmonary:      Effort: Pulmonary effort is normal.      Breath sounds: Normal breath sounds.   Abdominal:      General: There is no distension.   Musculoskeletal:         General: No swelling.      Cervical back: Neck supple.   Skin:     Coloration: Skin is not jaundiced.      Findings: No erythema.   Neurological:      Mental Status: She is alert. Mental status is at baseline.   Psychiatric:         Mood and Affect: Mood and affect normal.         Thought Content: Thought content normal.     BMI is >= 30 and <35. (Class 1 Obesity). The following options were offered after discussion;: information on healthy weight added to patient's after visit summary      Assessment and Plan     Diagnoses and all orders for " this visit:    1. Uncontrolled type 2 diabetes mellitus with hyperglycemia (Primary)  Comments:  Continue Glipizide at current dose. Repeat labs as noted. Additional recs per review.  Orders:  -     Hemoglobin A1c  -     Microalbumin / Creatinine Urine Ratio - Urine, Clean Catch    2. Primary hypertension  Comments:  Continue anti-hypertensive, as prescribed.  Orders:  -     CBC & Differential  -     Comprehensive Metabolic Panel    3. Dysuria  Comments:  UA reviewed. Doubt UTI. Watchful waiting.  Orders:  -     POCT urinalysis dipstick, automated    4. Hyperlipidemia, unspecified hyperlipidemia type  Comments:  Continue statin, as prescribed. Additional recs per review of cholesterol panel.  Orders:  -     Lipid Panel    5. Encounter for screening mammogram for malignant neoplasm of breast    Other orders  -     Discontinue: atorvastatin (LIPITOR) 80 MG tablet; Take 1 tablet by mouth Daily.  Dispense: 90 tablet; Refill: 1  -     Discontinue: cetirizine (Allergy Relief Cetirizine) 10 MG tablet; Take 1 tablet by mouth Daily.  Dispense: 90 tablet; Refill: 3  -     Discontinue: clopidogrel (PLAVIX) 75 MG tablet; Take 1 tablet by mouth Daily.  Dispense: 90 tablet; Refill: 1  -     Discontinue: dicyclomine (Bentyl) 10 MG capsule; Take 1 capsule by mouth Every 6 (Six) Hours As Needed for Abdominal Cramping (diarrhea).  Dispense: 120 capsule; Refill: 1  -     Discontinue: fluticasone (CUTIVATE) 0.005 % ointment; Apply 1 Application topically to the appropriate area as directed 2 (Two) Times a Day.  Dispense: 30 g; Refill: 3  -     Discontinue: glipizide (GLUCOTROL) 5 MG tablet; Take 1 tablet by mouth Daily for 90 days.  Dispense: 90 tablet; Refill: 1  -     Discontinue: isosorbide mononitrate (IMDUR) 60 MG 24 hr tablet; Take 1 tablet by mouth Daily.  Dispense: 90 tablet; Refill: 1  -     Discontinue: losartan (Cozaar) 100 MG tablet; Take 1 tablet by mouth Daily for 90 days.  Dispense: 90 tablet; Refill: 1  -      Discontinue: Magnesium Oxide -Mg Supplement 400 (240 Mg) MG tablet; Take 1 tablet by mouth Daily.  Dispense: 90 tablet; Refill: 1  -     Discontinue: metFORMIN (GLUCOPHAGE) 1000 MG tablet; Take 1 tablet by mouth 2 (Two) Times a Day.  Dispense: 180 tablet; Refill: 1  -     Discontinue: metoprolol tartrate (LOPRESSOR) 25 MG tablet; Take 1 tablet by mouth 2 (Two) Times a Day.  Dispense: 120 tablet; Refill: 2  -     Discontinue: montelukast (SINGULAIR) 10 MG tablet; Take 1 tablet by mouth Daily.  Dispense: 90 tablet; Refill: 1  -     Discontinue: nitroglycerin (Nitrostat) 0.4 MG SL tablet; Take 1 tablet by mouth As Needed for Chest Pain.  Dispense: 10 tablet; Refill: 3  -     Discontinue: pantoprazole (PROTONIX) 20 MG EC tablet; Take 1 tablet by mouth Daily.  Dispense: 90 tablet; Refill: 3  -     Discontinue: tiZANidine (ZANAFLEX) 4 MG tablet; Take 1 tablet by mouth Every 8 (Eight) Hours As Needed for Muscle Spasms.  Dispense: 45 tablet; Refill: 2  -     atorvastatin (LIPITOR) 80 MG tablet; Take 1 tablet by mouth Daily.  Dispense: 90 tablet; Refill: 1  -     cetirizine (Allergy Relief Cetirizine) 10 MG tablet; Take 1 tablet by mouth Daily.  Dispense: 90 tablet; Refill: 3  -     clopidogrel (PLAVIX) 75 MG tablet; Take 1 tablet by mouth Daily.  Dispense: 90 tablet; Refill: 1  -     dicyclomine (Bentyl) 10 MG capsule; Take 1 capsule by mouth Every 6 (Six) Hours As Needed for Abdominal Cramping (diarrhea).  Dispense: 120 capsule; Refill: 1  -     fluticasone (CUTIVATE) 0.005 % ointment; Apply 1 Application topically to the appropriate area as directed 2 (Two) Times a Day.  Dispense: 30 g; Refill: 3  -     glipizide (GLUCOTROL) 5 MG tablet; Take 1 tablet by mouth Daily for 90 days.  Dispense: 90 tablet; Refill: 1  -     isosorbide mononitrate (IMDUR) 60 MG 24 hr tablet; Take 1 tablet by mouth Daily.  Dispense: 90 tablet; Refill: 1  -     losartan (Cozaar) 100 MG tablet; Take 1 tablet by mouth Daily for 90 days.  Dispense: 90  tablet; Refill: 1  -     Magnesium Oxide -Mg Supplement 400 (240 Mg) MG tablet; Take 1 tablet by mouth Daily.  Dispense: 90 tablet; Refill: 1  -     metFORMIN (GLUCOPHAGE) 1000 MG tablet; Take 1 tablet by mouth 2 (Two) Times a Day.  Dispense: 180 tablet; Refill: 1  -     metoprolol tartrate (LOPRESSOR) 25 MG tablet; Take 1 tablet by mouth 2 (Two) Times a Day.  Dispense: 120 tablet; Refill: 2  -     montelukast (SINGULAIR) 10 MG tablet; Take 1 tablet by mouth Daily.  Dispense: 90 tablet; Refill: 1  -     nitroglycerin (Nitrostat) 0.4 MG SL tablet; Take 1 tablet by mouth As Needed for Chest Pain.  Dispense: 10 tablet; Refill: 3  -     pantoprazole (PROTONIX) 20 MG EC tablet; Take 1 tablet by mouth Daily.  Dispense: 90 tablet; Refill: 3  -     tiZANidine (ZANAFLEX) 4 MG tablet; Take 1 tablet by mouth Every 8 (Eight) Hours As Needed for Muscle Spasms.  Dispense: 45 tablet; Refill: 2    Follow Up     Return in about 3 months (around 5/5/2025).    Patient was given instructions and counseling regarding her condition or for health maintenance advice. Please see specific information pulled into the AVS if appropriate.

## 2025-04-07 ENCOUNTER — TELEPHONE (OUTPATIENT)
Dept: FAMILY MEDICINE CLINIC | Facility: CLINIC | Age: 55
End: 2025-04-07

## 2025-04-07 DIAGNOSIS — Z12.31 SCREENING MAMMOGRAM FOR BREAST CANCER: Primary | ICD-10-CM

## 2025-04-07 NOTE — TELEPHONE ENCOUNTER
Caller: Ashli Gutierrez    Relationship: Self    Best call back number: [phone]     What is the medical concern/diagnosis: PREVENTIVE    What specialty or service is being requested: MAMMOGRAM      Any additional details: PATIENT STATES SHE NEEDS A REFERRAL TO HAVE THIS DONE

## 2025-05-05 ENCOUNTER — OFFICE VISIT (OUTPATIENT)
Dept: FAMILY MEDICINE CLINIC | Facility: CLINIC | Age: 55
End: 2025-05-05
Payer: COMMERCIAL

## 2025-05-05 VITALS
WEIGHT: 200.6 LBS | BODY MASS INDEX: 32.38 KG/M2 | OXYGEN SATURATION: 96 % | HEART RATE: 91 BPM | TEMPERATURE: 98.2 F | DIASTOLIC BLOOD PRESSURE: 80 MMHG | SYSTOLIC BLOOD PRESSURE: 118 MMHG

## 2025-05-05 DIAGNOSIS — H91.92 HEARING LOSS OF LEFT EAR, UNSPECIFIED HEARING LOSS TYPE: ICD-10-CM

## 2025-05-05 DIAGNOSIS — E11.40 TYPE 2 DIABETES MELLITUS WITH DIABETIC NEUROPATHY, WITHOUT LONG-TERM CURRENT USE OF INSULIN: Primary | ICD-10-CM

## 2025-05-05 DIAGNOSIS — E66.811 CLASS 1 OBESITY: ICD-10-CM

## 2025-05-05 PROCEDURE — 3079F DIAST BP 80-89 MM HG: CPT | Performed by: FAMILY MEDICINE

## 2025-05-05 PROCEDURE — 1126F AMNT PAIN NOTED NONE PRSNT: CPT | Performed by: FAMILY MEDICINE

## 2025-05-05 PROCEDURE — 3074F SYST BP LT 130 MM HG: CPT | Performed by: FAMILY MEDICINE

## 2025-05-05 PROCEDURE — 3051F HG A1C>EQUAL 7.0%<8.0%: CPT | Performed by: FAMILY MEDICINE

## 2025-05-05 PROCEDURE — 99214 OFFICE O/P EST MOD 30 MIN: CPT | Performed by: FAMILY MEDICINE

## 2025-05-05 RX ORDER — ONDANSETRON 4 MG/1
TABLET, FILM COATED ORAL
COMMUNITY

## 2025-05-05 RX ORDER — METOPROLOL SUCCINATE 50 MG/1
50 TABLET, EXTENDED RELEASE ORAL 2 TIMES DAILY
COMMUNITY

## 2025-05-05 RX ORDER — CLOTRIMAZOLE AND BETAMETHASONE DIPROPIONATE 10; .64 MG/G; MG/G
1 CREAM TOPICAL 2 TIMES DAILY
COMMUNITY

## 2025-05-05 RX ORDER — SODIUM, POTASSIUM,MAG SULFATES 17.5-3.13G
SOLUTION, RECONSTITUTED, ORAL ORAL
COMMUNITY

## 2025-05-05 NOTE — PROGRESS NOTES
Chief Complaint    Earache (Left ear pain) and Weight Loss (Wants to discuss a weight loss shot)    Subjective      Ashli Gutierrez presents to [facility]    History of Present Illness    1.) LEFT EAR PAIN : Onset - > 24 hours. Patient notes an intermittent pain of her left ear. She also reports that she has noticed hearing loss per that ear. No fevers or chills. No drainage.     2.) WEIGHT LOSS/NIDDM : Most recent A1C - 7.20% - 2 mos ago. Patient is currently prescribed Metformin and Glipizide. History of CAD. Notes changes to her diet and lifestyle, which has provided some, but not robust weight loss. She reports discussing a GLP-agonist with cardiology, who supports starting the med.     Objective     Vital Signs:     /80 (BP Location: Right arm, Patient Position: Sitting, Cuff Size: Adult)   Pulse 91   Temp 98.2 °F (36.8 °C) (Infrared)   Wt 91 kg (200 lb 9.6 oz)   SpO2 96%   BMI 32.38 kg/m²       Physical Exam  Vitals reviewed.   Constitutional:       General: She is not in acute distress.     Appearance: Normal appearance. She is well-developed.   HENT:      Head: Normocephalic and atraumatic.      Right Ear: Hearing and external ear normal.      Left Ear: Hearing and external ear normal.      Nose: Nose normal.   Eyes:      General: Lids are normal.         Right eye: No discharge.         Left eye: No discharge.      Conjunctiva/sclera: Conjunctivae normal.   Pulmonary:      Effort: Pulmonary effort is normal.   Abdominal:      General: There is no distension.   Musculoskeletal:         General: No swelling.      Cervical back: Neck supple.   Skin:     Coloration: Skin is not jaundiced.      Findings: No erythema.   Neurological:      Mental Status: She is alert. Mental status is at baseline.   Psychiatric:         Mood and Affect: Mood and affect normal.         Thought Content: Thought content normal.     Assessment and Plan     Diagnoses and all orders for this  visit:    1. Type 2 diabetes mellitus with diabetic neuropathy, without long-term current use of insulin (Primary)  Comments:  Shared decision to discontinue sulfonylurea; start GLP-agonist; continue biguanide. Common side effects per GLP discussed. No hx of thyroid issues.    2. Hearing loss of left ear, unspecified hearing loss type  Comments:  Neg ear exam; shared decision for hearing test per audiology.  Orders:  -     Ambulatory Referral to Audiology    3. Class 1 obesity  Comments:  Continue with lifestyle changes.    Other orders  -     Semaglutide,0.25 or 0.5MG/DOS, (OZEMPIC) 2 MG/3ML solution pen-injector; Inject 0.25 mg under the skin into the appropriate area as directed 1 (One) Time Per Week for 30 days.  Dispense: 1.5 mL; Refill: 0    Follow Up : 1 mo    Patient was given instructions and counseling regarding her condition or for health maintenance advice. Please see specific information pulled into the AVS if appropriate.

## 2025-05-09 DIAGNOSIS — H91.92 HEARING LOSS OF LEFT EAR, UNSPECIFIED HEARING LOSS TYPE: Primary | ICD-10-CM

## 2025-05-14 ENCOUNTER — TELEPHONE (OUTPATIENT)
Dept: FAMILY MEDICINE CLINIC | Facility: CLINIC | Age: 55
End: 2025-05-14
Payer: COMMERCIAL

## 2025-05-14 RX ORDER — MECOBALAMIN 5000 MCG
15 TABLET,DISINTEGRATING ORAL DAILY
Qty: 30 CAPSULE | Refills: 2 | Status: SHIPPED | OUTPATIENT
Start: 2025-05-14

## 2025-05-14 NOTE — TELEPHONE ENCOUNTER
Patient called back and she was taking the pantoprazole for years but her pharmacy called and said that she should not take that with the plavix.  Then she started the other one.  But she states that one really hurts her stomach.  She was wondering if there was anything else she could try? That is ok with the plavix.

## 2025-05-14 NOTE — TELEPHONE ENCOUNTER
We can try Prevacid, which is less likely to interfere with Plavix. Sent to pharmacy. Pt can let us know if any issues. Thank you!

## 2025-05-29 ENCOUNTER — HOSPITAL ENCOUNTER (OUTPATIENT)
Dept: MAMMOGRAPHY | Facility: HOSPITAL | Age: 55
Discharge: HOME | End: 2025-05-29
Admitting: FAMILY MEDICINE
Payer: COMMERCIAL

## 2025-05-29 DIAGNOSIS — Z12.31 SCREENING MAMMOGRAM FOR BREAST CANCER: ICD-10-CM

## 2025-05-29 PROCEDURE — 77063 BREAST TOMOSYNTHESIS BI: CPT

## 2025-05-29 PROCEDURE — 77067 SCR MAMMO BI INCL CAD: CPT

## 2025-06-09 ENCOUNTER — OFFICE VISIT (OUTPATIENT)
Dept: FAMILY MEDICINE CLINIC | Facility: CLINIC | Age: 55
End: 2025-06-09
Payer: COMMERCIAL

## 2025-06-09 VITALS
OXYGEN SATURATION: 95 % | SYSTOLIC BLOOD PRESSURE: 126 MMHG | BODY MASS INDEX: 32.22 KG/M2 | DIASTOLIC BLOOD PRESSURE: 82 MMHG | WEIGHT: 199.6 LBS | TEMPERATURE: 98 F | HEART RATE: 83 BPM

## 2025-06-09 DIAGNOSIS — Z12.11 SCREENING FOR COLON CANCER: ICD-10-CM

## 2025-06-09 DIAGNOSIS — R30.0 DYSURIA: Primary | ICD-10-CM

## 2025-06-09 LAB
BILIRUB BLD-MCNC: NEGATIVE MG/DL
CLARITY, POC: CLEAR
COLOR UR: YELLOW
EXPIRATION DATE: ABNORMAL
GLUCOSE UR STRIP-MCNC: NEGATIVE MG/DL
KETONES UR QL: ABNORMAL
LEUKOCYTE EST, POC: NEGATIVE
Lab: ABNORMAL
NITRITE UR-MCNC: NEGATIVE MG/ML
PH UR: 5.5 [PH] (ref 5–8)
PROT UR STRIP-MCNC: ABNORMAL MG/DL
RBC # UR STRIP: NEGATIVE /UL
SP GR UR: 1.02 (ref 1–1.03)
UROBILINOGEN UR QL: ABNORMAL

## 2025-06-09 PROCEDURE — 87086 URINE CULTURE/COLONY COUNT: CPT | Performed by: FAMILY MEDICINE

## 2025-06-09 PROCEDURE — 99213 OFFICE O/P EST LOW 20 MIN: CPT | Performed by: FAMILY MEDICINE

## 2025-06-09 PROCEDURE — 3051F HG A1C>EQUAL 7.0%<8.0%: CPT | Performed by: FAMILY MEDICINE

## 2025-06-09 PROCEDURE — 3074F SYST BP LT 130 MM HG: CPT | Performed by: FAMILY MEDICINE

## 2025-06-09 PROCEDURE — 1126F AMNT PAIN NOTED NONE PRSNT: CPT | Performed by: FAMILY MEDICINE

## 2025-06-09 PROCEDURE — 3079F DIAST BP 80-89 MM HG: CPT | Performed by: FAMILY MEDICINE

## 2025-06-09 RX ORDER — SEMAGLUTIDE 0.68 MG/ML
0.5 INJECTION, SOLUTION SUBCUTANEOUS WEEKLY
COMMUNITY
Start: 2025-05-06

## 2025-06-09 NOTE — PROGRESS NOTES
Chief Complaint    Urinary Frequency, Difficulty Urinating (She is having a burning sensation with peeing), and Colonoscopy (Wants to get a referral for a colonoscopy)    Subjective      Ashli Gutierrez presents to [facility]    1.) DYSURIA : Patient reports chronic symptoms.  She has been experiencing symptoms concerning for dysuria for the past several months.  She reports intermittently experiencing urinary frequency and reports a burning sensation, even when not urinating.  No recent urine testing noted in chart.  No fevers or chills.  No reported flank pain.  No reported gross hematuria.    Objective     Vital Signs:     /82 (BP Location: Left arm, Patient Position: Sitting, Cuff Size: Adult)   Pulse 83   Temp 98 °F (36.7 °C) (Infrared)   Wt 90.5 kg (199 lb 9.6 oz)   SpO2 95%   BMI 32.22 kg/m²       Physical Exam  Vitals reviewed.   Constitutional:       General: She is not in acute distress.     Appearance: Normal appearance. She is well-developed.   HENT:      Head: Normocephalic and atraumatic.      Right Ear: Hearing and external ear normal.      Left Ear: Hearing and external ear normal.      Nose: Nose normal.   Eyes:      General: Lids are normal.         Right eye: No discharge.         Left eye: No discharge.      Conjunctiva/sclera: Conjunctivae normal.   Pulmonary:      Effort: Pulmonary effort is normal.   Abdominal:      General: There is no distension.   Musculoskeletal:         General: No swelling.      Cervical back: Neck supple.   Skin:     Coloration: Skin is not jaundiced.      Findings: No erythema.   Neurological:      Mental Status: She is alert. Mental status is at baseline.   Psychiatric:         Mood and Affect: Mood and affect normal.         Thought Content: Thought content normal.     Assessment and Plan     Diagnoses and all orders for this visit:    1. Dysuria (Primary)  Comments:  UA does not appear to suggest a UTI. Urine cx ordered.  If neg, advised to consider interstitial cystitis. Handout regarding condition given to pt.  Orders:  -     POCT urinalysis dipstick, automated  -     Urine Culture - Urine, Urine, Clean Catch    2. Screening for colon cancer  -     Cancel: Ambulatory Referral to General Surgery  -     Ambulatory Referral to General Surgery    Follow Up     Return if symptoms worsen or fail to improve.    Patient was given instructions and counseling regarding her condition or for health maintenance advice. Please see specific information pulled into the AVS if appropriate.

## 2025-06-10 ENCOUNTER — RESULTS FOLLOW-UP (OUTPATIENT)
Dept: FAMILY MEDICINE CLINIC | Facility: CLINIC | Age: 55
End: 2025-06-10
Payer: COMMERCIAL

## 2025-06-10 LAB — BACTERIA SPEC AEROBE CULT: NORMAL
